# Patient Record
Sex: FEMALE | Race: OTHER | HISPANIC OR LATINO
[De-identification: names, ages, dates, MRNs, and addresses within clinical notes are randomized per-mention and may not be internally consistent; named-entity substitution may affect disease eponyms.]

---

## 2017-02-07 ENCOUNTER — TRANSCRIPTION ENCOUNTER (OUTPATIENT)
Age: 37
End: 2017-02-07

## 2017-06-12 ENCOUNTER — APPOINTMENT (OUTPATIENT)
Dept: VASCULAR SURGERY | Facility: CLINIC | Age: 37
End: 2017-06-12

## 2017-06-16 ENCOUNTER — APPOINTMENT (OUTPATIENT)
Dept: VASCULAR SURGERY | Facility: CLINIC | Age: 37
End: 2017-06-16

## 2017-06-16 VITALS
OXYGEN SATURATION: 98 % | HEART RATE: 80 BPM | DIASTOLIC BLOOD PRESSURE: 91 MMHG | HEIGHT: 64 IN | BODY MASS INDEX: 28.51 KG/M2 | SYSTOLIC BLOOD PRESSURE: 137 MMHG | WEIGHT: 167 LBS | TEMPERATURE: 98 F | RESPIRATION RATE: 16 BRPM

## 2017-06-23 ENCOUNTER — APPOINTMENT (OUTPATIENT)
Dept: VASCULAR SURGERY | Facility: CLINIC | Age: 37
End: 2017-06-23

## 2017-06-30 ENCOUNTER — APPOINTMENT (OUTPATIENT)
Dept: VASCULAR SURGERY | Facility: CLINIC | Age: 37
End: 2017-06-30

## 2017-09-02 ENCOUNTER — TRANSCRIPTION ENCOUNTER (OUTPATIENT)
Age: 37
End: 2017-09-02

## 2017-09-11 ENCOUNTER — OUTPATIENT (OUTPATIENT)
Dept: OUTPATIENT SERVICES | Facility: HOSPITAL | Age: 37
LOS: 1 days | Discharge: HOME | End: 2017-09-11

## 2017-09-11 DIAGNOSIS — I80.9 PHLEBITIS AND THROMBOPHLEBITIS OF UNSPECIFIED SITE: ICD-10-CM

## 2017-09-11 DIAGNOSIS — Z98.89 OTHER SPECIFIED POSTPROCEDURAL STATES: Chronic | ICD-10-CM

## 2017-09-11 DIAGNOSIS — C50.919 MALIGNANT NEOPLASM OF UNSPECIFIED SITE OF UNSPECIFIED FEMALE BREAST: ICD-10-CM

## 2017-09-11 DIAGNOSIS — Z96.22 MYRINGOTOMY TUBE(S) STATUS: Chronic | ICD-10-CM

## 2017-09-11 DIAGNOSIS — R93.3 ABNORMAL FINDINGS ON DIAGNOSTIC IMAGING OF OTHER PARTS OF DIGESTIVE TRACT: Chronic | ICD-10-CM

## 2017-09-11 DIAGNOSIS — Z86.711 PERSONAL HISTORY OF PULMONARY EMBOLISM: Chronic | ICD-10-CM

## 2017-09-11 DIAGNOSIS — C53.9 MALIGNANT NEOPLASM OF CERVIX UTERI, UNSPECIFIED: Chronic | ICD-10-CM

## 2017-09-12 ENCOUNTER — APPOINTMENT (OUTPATIENT)
Dept: HEMATOLOGY ONCOLOGY | Facility: CLINIC | Age: 37
End: 2017-09-12
Payer: COMMERCIAL

## 2017-09-12 ENCOUNTER — OUTPATIENT (OUTPATIENT)
Dept: OUTPATIENT SERVICES | Facility: HOSPITAL | Age: 37
LOS: 1 days | Discharge: HOME | End: 2017-09-12

## 2017-09-12 ENCOUNTER — OUTPATIENT (OUTPATIENT)
Dept: OUTPATIENT SERVICES | Facility: HOSPITAL | Age: 37
LOS: 1 days | Discharge: HOME | End: 2017-09-12
Payer: COMMERCIAL

## 2017-09-12 VITALS
HEART RATE: 75 BPM | TEMPERATURE: 97.8 F | BODY MASS INDEX: 32.27 KG/M2 | DIASTOLIC BLOOD PRESSURE: 90 MMHG | HEIGHT: 64.69 IN | WEIGHT: 191.36 LBS | OXYGEN SATURATION: 98 % | RESPIRATION RATE: 16 BRPM | SYSTOLIC BLOOD PRESSURE: 130 MMHG

## 2017-09-12 DIAGNOSIS — Z96.22 MYRINGOTOMY TUBE(S) STATUS: Chronic | ICD-10-CM

## 2017-09-12 DIAGNOSIS — R93.3 ABNORMAL FINDINGS ON DIAGNOSTIC IMAGING OF OTHER PARTS OF DIGESTIVE TRACT: Chronic | ICD-10-CM

## 2017-09-12 DIAGNOSIS — Z98.89 OTHER SPECIFIED POSTPROCEDURAL STATES: Chronic | ICD-10-CM

## 2017-09-12 DIAGNOSIS — Z86.711 PERSONAL HISTORY OF PULMONARY EMBOLISM: Chronic | ICD-10-CM

## 2017-09-12 DIAGNOSIS — M87.059 IDIOPATHIC ASEPTIC NECROSIS OF UNSPECIFIED FEMUR: ICD-10-CM

## 2017-09-12 DIAGNOSIS — C53.9 MALIGNANT NEOPLASM OF CERVIX UTERI, UNSPECIFIED: Chronic | ICD-10-CM

## 2017-09-12 DIAGNOSIS — C53.9 MALIGNANT NEOPLASM OF CERVIX UTERI, UNSPECIFIED: ICD-10-CM

## 2017-09-12 PROCEDURE — 99205 OFFICE O/P NEW HI 60 MIN: CPT

## 2017-09-12 RX ORDER — ALPRAZOLAM 1 MG/1
1 TABLET ORAL
Qty: 30 | Refills: 0 | Status: DISCONTINUED | COMMUNITY
Start: 2017-03-24 | End: 2017-09-12

## 2017-09-12 RX ORDER — FONDAPARINUX SODIUM 7.5 MG/.6ML
7.5 INJECTION, SOLUTION SUBCUTANEOUS
Refills: 0 | Status: DISCONTINUED | COMMUNITY

## 2017-09-19 ENCOUNTER — RESULT REVIEW (OUTPATIENT)
Age: 37
End: 2017-09-19

## 2017-09-19 PROCEDURE — 88321 CONSLTJ&REPRT SLD PREP ELSWR: CPT

## 2017-09-22 LAB — SURGICAL PATHOLOGY STUDY: SIGNIFICANT CHANGE UP

## 2017-10-22 ENCOUNTER — FORM ENCOUNTER (OUTPATIENT)
Age: 37
End: 2017-10-22

## 2017-10-23 ENCOUNTER — APPOINTMENT (OUTPATIENT)
Dept: NUCLEAR MEDICINE | Facility: CLINIC | Age: 37
End: 2017-10-23
Payer: COMMERCIAL

## 2017-10-23 ENCOUNTER — OUTPATIENT (OUTPATIENT)
Dept: OUTPATIENT SERVICES | Facility: HOSPITAL | Age: 37
LOS: 1 days | End: 2017-10-23

## 2017-10-23 DIAGNOSIS — Z96.22 MYRINGOTOMY TUBE(S) STATUS: Chronic | ICD-10-CM

## 2017-10-23 DIAGNOSIS — Z98.89 OTHER SPECIFIED POSTPROCEDURAL STATES: Chronic | ICD-10-CM

## 2017-10-23 DIAGNOSIS — Z86.711 PERSONAL HISTORY OF PULMONARY EMBOLISM: Chronic | ICD-10-CM

## 2017-10-23 DIAGNOSIS — R93.3 ABNORMAL FINDINGS ON DIAGNOSTIC IMAGING OF OTHER PARTS OF DIGESTIVE TRACT: Chronic | ICD-10-CM

## 2017-10-23 DIAGNOSIS — C53.9 MALIGNANT NEOPLASM OF CERVIX UTERI, UNSPECIFIED: ICD-10-CM

## 2017-10-23 DIAGNOSIS — C53.9 MALIGNANT NEOPLASM OF CERVIX UTERI, UNSPECIFIED: Chronic | ICD-10-CM

## 2017-10-23 PROCEDURE — 78815 PET IMAGE W/CT SKULL-THIGH: CPT | Mod: 26,PS

## 2017-10-30 ENCOUNTER — TRANSCRIPTION ENCOUNTER (OUTPATIENT)
Age: 37
End: 2017-10-30

## 2017-11-10 ENCOUNTER — APPOINTMENT (OUTPATIENT)
Dept: OTOLARYNGOLOGY | Facility: CLINIC | Age: 37
End: 2017-11-10
Payer: COMMERCIAL

## 2017-11-10 ENCOUNTER — OUTPATIENT (OUTPATIENT)
Dept: OUTPATIENT SERVICES | Facility: HOSPITAL | Age: 37
LOS: 1 days | Discharge: HOME | End: 2017-11-10

## 2017-11-10 DIAGNOSIS — Z96.22 MYRINGOTOMY TUBE(S) STATUS: Chronic | ICD-10-CM

## 2017-11-10 DIAGNOSIS — Z86.711 PERSONAL HISTORY OF PULMONARY EMBOLISM: Chronic | ICD-10-CM

## 2017-11-10 DIAGNOSIS — Z98.89 OTHER SPECIFIED POSTPROCEDURAL STATES: Chronic | ICD-10-CM

## 2017-11-10 DIAGNOSIS — C53.9 MALIGNANT NEOPLASM OF CERVIX UTERI, UNSPECIFIED: Chronic | ICD-10-CM

## 2017-11-10 DIAGNOSIS — J35.1 HYPERTROPHY OF TONSILS: ICD-10-CM

## 2017-11-10 DIAGNOSIS — R93.3 ABNORMAL FINDINGS ON DIAGNOSTIC IMAGING OF OTHER PARTS OF DIGESTIVE TRACT: Chronic | ICD-10-CM

## 2017-11-10 DIAGNOSIS — C53.9 MALIGNANT NEOPLASM OF CERVIX UTERI, UNSPECIFIED: ICD-10-CM

## 2017-11-10 PROCEDURE — 99203 OFFICE O/P NEW LOW 30 MIN: CPT

## 2017-11-14 ENCOUNTER — APPOINTMENT (OUTPATIENT)
Dept: HEMATOLOGY ONCOLOGY | Facility: CLINIC | Age: 37
End: 2017-11-14
Payer: COMMERCIAL

## 2017-11-14 VITALS
DIASTOLIC BLOOD PRESSURE: 86 MMHG | RESPIRATION RATE: 16 BRPM | TEMPERATURE: 97.7 F | WEIGHT: 192.57 LBS | SYSTOLIC BLOOD PRESSURE: 130 MMHG | OXYGEN SATURATION: 97 % | BODY MASS INDEX: 32.35 KG/M2 | HEART RATE: 79 BPM

## 2017-11-14 PROCEDURE — 99214 OFFICE O/P EST MOD 30 MIN: CPT

## 2017-11-17 ENCOUNTER — APPOINTMENT (OUTPATIENT)
Dept: RADIOLOGY | Facility: CLINIC | Age: 37
End: 2017-11-17

## 2017-11-21 ENCOUNTER — FORM ENCOUNTER (OUTPATIENT)
Age: 37
End: 2017-11-21

## 2017-11-22 ENCOUNTER — OUTPATIENT (OUTPATIENT)
Dept: OUTPATIENT SERVICES | Facility: HOSPITAL | Age: 37
LOS: 1 days | End: 2017-11-22
Payer: COMMERCIAL

## 2017-11-22 ENCOUNTER — RESULT REVIEW (OUTPATIENT)
Age: 37
End: 2017-11-22

## 2017-11-22 ENCOUNTER — APPOINTMENT (OUTPATIENT)
Dept: ULTRASOUND IMAGING | Facility: IMAGING CENTER | Age: 37
End: 2017-11-22
Payer: COMMERCIAL

## 2017-11-22 DIAGNOSIS — Z86.711 PERSONAL HISTORY OF PULMONARY EMBOLISM: Chronic | ICD-10-CM

## 2017-11-22 DIAGNOSIS — Z98.89 OTHER SPECIFIED POSTPROCEDURAL STATES: Chronic | ICD-10-CM

## 2017-11-22 DIAGNOSIS — C53.9 MALIGNANT NEOPLASM OF CERVIX UTERI, UNSPECIFIED: Chronic | ICD-10-CM

## 2017-11-22 DIAGNOSIS — Z96.22 MYRINGOTOMY TUBE(S) STATUS: Chronic | ICD-10-CM

## 2017-11-22 DIAGNOSIS — J35.1 HYPERTROPHY OF TONSILS: ICD-10-CM

## 2017-11-22 DIAGNOSIS — R93.3 ABNORMAL FINDINGS ON DIAGNOSTIC IMAGING OF OTHER PARTS OF DIGESTIVE TRACT: Chronic | ICD-10-CM

## 2017-11-22 PROCEDURE — 88305 TISSUE EXAM BY PATHOLOGIST: CPT

## 2017-11-22 PROCEDURE — 88305 TISSUE EXAM BY PATHOLOGIST: CPT | Mod: 26

## 2017-11-22 PROCEDURE — 88172 CYTP DX EVAL FNA 1ST EA SITE: CPT

## 2017-11-22 PROCEDURE — 88342 IMHCHEM/IMCYTCHM 1ST ANTB: CPT

## 2017-11-22 PROCEDURE — 88341 IMHCHEM/IMCYTCHM EA ADD ANTB: CPT

## 2017-11-22 PROCEDURE — 88342 IMHCHEM/IMCYTCHM 1ST ANTB: CPT | Mod: 26

## 2017-11-22 PROCEDURE — 10022: CPT

## 2017-11-22 PROCEDURE — 88173 CYTOPATH EVAL FNA REPORT: CPT | Mod: 26

## 2017-11-22 PROCEDURE — 76942 ECHO GUIDE FOR BIOPSY: CPT | Mod: 26

## 2017-11-22 PROCEDURE — 76942 ECHO GUIDE FOR BIOPSY: CPT

## 2017-11-22 PROCEDURE — 88173 CYTOPATH EVAL FNA REPORT: CPT

## 2017-11-22 PROCEDURE — 88341 IMHCHEM/IMCYTCHM EA ADD ANTB: CPT | Mod: 26

## 2017-11-27 LAB — NON-GYNECOLOGICAL CYTOLOGY STUDY: SIGNIFICANT CHANGE UP

## 2017-11-28 ENCOUNTER — RESULT REVIEW (OUTPATIENT)
Age: 37
End: 2017-11-28

## 2017-12-11 ENCOUNTER — APPOINTMENT (OUTPATIENT)
Dept: INTERNAL MEDICINE | Facility: CLINIC | Age: 37
End: 2017-12-11

## 2018-05-04 ENCOUNTER — OUTPATIENT (OUTPATIENT)
Dept: OUTPATIENT SERVICES | Facility: HOSPITAL | Age: 38
LOS: 1 days | End: 2018-05-04

## 2018-05-04 ENCOUNTER — APPOINTMENT (OUTPATIENT)
Dept: ULTRASOUND IMAGING | Facility: CLINIC | Age: 38
End: 2018-05-04
Payer: COMMERCIAL

## 2018-05-04 ENCOUNTER — APPOINTMENT (OUTPATIENT)
Dept: RADIOLOGY | Facility: CLINIC | Age: 38
End: 2018-05-04

## 2018-05-04 DIAGNOSIS — Z98.89 OTHER SPECIFIED POSTPROCEDURAL STATES: Chronic | ICD-10-CM

## 2018-05-04 DIAGNOSIS — Z96.22 MYRINGOTOMY TUBE(S) STATUS: Chronic | ICD-10-CM

## 2018-05-04 DIAGNOSIS — Z00.8 ENCOUNTER FOR OTHER GENERAL EXAMINATION: ICD-10-CM

## 2018-05-04 DIAGNOSIS — R93.3 ABNORMAL FINDINGS ON DIAGNOSTIC IMAGING OF OTHER PARTS OF DIGESTIVE TRACT: Chronic | ICD-10-CM

## 2018-05-04 DIAGNOSIS — C53.9 MALIGNANT NEOPLASM OF CERVIX UTERI, UNSPECIFIED: Chronic | ICD-10-CM

## 2018-05-04 DIAGNOSIS — Z86.711 PERSONAL HISTORY OF PULMONARY EMBOLISM: Chronic | ICD-10-CM

## 2018-05-04 PROCEDURE — 93971 EXTREMITY STUDY: CPT | Mod: 26,RT

## 2018-05-10 ENCOUNTER — OUTPATIENT (OUTPATIENT)
Dept: OUTPATIENT SERVICES | Facility: HOSPITAL | Age: 38
LOS: 1 days | Discharge: HOME | End: 2018-05-10

## 2018-05-10 DIAGNOSIS — Z98.89 OTHER SPECIFIED POSTPROCEDURAL STATES: Chronic | ICD-10-CM

## 2018-05-10 DIAGNOSIS — Z96.22 MYRINGOTOMY TUBE(S) STATUS: Chronic | ICD-10-CM

## 2018-05-10 DIAGNOSIS — R93.3 ABNORMAL FINDINGS ON DIAGNOSTIC IMAGING OF OTHER PARTS OF DIGESTIVE TRACT: Chronic | ICD-10-CM

## 2018-05-10 DIAGNOSIS — C53.9 MALIGNANT NEOPLASM OF CERVIX UTERI, UNSPECIFIED: ICD-10-CM

## 2018-05-10 DIAGNOSIS — C53.9 MALIGNANT NEOPLASM OF CERVIX UTERI, UNSPECIFIED: Chronic | ICD-10-CM

## 2018-05-10 DIAGNOSIS — Z86.711 PERSONAL HISTORY OF PULMONARY EMBOLISM: Chronic | ICD-10-CM

## 2018-05-14 ENCOUNTER — APPOINTMENT (OUTPATIENT)
Dept: HEMATOLOGY ONCOLOGY | Facility: CLINIC | Age: 38
End: 2018-05-14

## 2018-09-05 ENCOUNTER — EMERGENCY (EMERGENCY)
Facility: HOSPITAL | Age: 38
LOS: 1 days | Discharge: HOME | End: 2018-09-05
Attending: EMERGENCY MEDICINE
Payer: COMMERCIAL

## 2018-09-05 ENCOUNTER — APPOINTMENT (OUTPATIENT)
Dept: CT IMAGING | Facility: CLINIC | Age: 38
End: 2018-09-05
Payer: COMMERCIAL

## 2018-09-05 ENCOUNTER — OUTPATIENT (OUTPATIENT)
Dept: OUTPATIENT SERVICES | Facility: HOSPITAL | Age: 38
LOS: 1 days | End: 2018-09-05
Payer: COMMERCIAL

## 2018-09-05 VITALS
RESPIRATION RATE: 18 BRPM | SYSTOLIC BLOOD PRESSURE: 135 MMHG | OXYGEN SATURATION: 100 % | HEART RATE: 87 BPM | WEIGHT: 190.92 LBS | TEMPERATURE: 98 F | DIASTOLIC BLOOD PRESSURE: 82 MMHG | HEIGHT: 64 IN

## 2018-09-05 DIAGNOSIS — Z98.89 OTHER SPECIFIED POSTPROCEDURAL STATES: Chronic | ICD-10-CM

## 2018-09-05 DIAGNOSIS — Z96.22 MYRINGOTOMY TUBE(S) STATUS: Chronic | ICD-10-CM

## 2018-09-05 DIAGNOSIS — Z86.711 PERSONAL HISTORY OF PULMONARY EMBOLISM: Chronic | ICD-10-CM

## 2018-09-05 DIAGNOSIS — R93.3 ABNORMAL FINDINGS ON DIAGNOSTIC IMAGING OF OTHER PARTS OF DIGESTIVE TRACT: Chronic | ICD-10-CM

## 2018-09-05 DIAGNOSIS — Z00.8 ENCOUNTER FOR OTHER GENERAL EXAMINATION: ICD-10-CM

## 2018-09-05 DIAGNOSIS — C53.9 MALIGNANT NEOPLASM OF CERVIX UTERI, UNSPECIFIED: Chronic | ICD-10-CM

## 2018-09-05 LAB
ALBUMIN SERPL ELPH-MCNC: 4.1 G/DL — SIGNIFICANT CHANGE UP (ref 3.3–5.2)
ALP SERPL-CCNC: 83 U/L — SIGNIFICANT CHANGE UP (ref 40–120)
ALT FLD-CCNC: 17 U/L — SIGNIFICANT CHANGE UP
ANION GAP SERPL CALC-SCNC: 13 MMOL/L — SIGNIFICANT CHANGE UP (ref 5–17)
APPEARANCE UR: CLEAR — SIGNIFICANT CHANGE UP
APTT BLD: 52.7 SEC — HIGH (ref 27.5–37.4)
AST SERPL-CCNC: 15 U/L — SIGNIFICANT CHANGE UP
BACTERIA # UR AUTO: ABNORMAL
BILIRUB SERPL-MCNC: 0.3 MG/DL — LOW (ref 0.4–2)
BILIRUB UR-MCNC: NEGATIVE — SIGNIFICANT CHANGE UP
BUN SERPL-MCNC: 12 MG/DL — SIGNIFICANT CHANGE UP (ref 8–20)
CALCIUM SERPL-MCNC: 9.4 MG/DL — SIGNIFICANT CHANGE UP (ref 8.6–10.2)
CHLORIDE SERPL-SCNC: 102 MMOL/L — SIGNIFICANT CHANGE UP (ref 98–107)
CK SERPL-CCNC: 56 U/L — SIGNIFICANT CHANGE UP (ref 25–170)
CO2 SERPL-SCNC: 23 MMOL/L — SIGNIFICANT CHANGE UP (ref 22–29)
COLOR SPEC: YELLOW — SIGNIFICANT CHANGE UP
CREAT SERPL-MCNC: 0.85 MG/DL — SIGNIFICANT CHANGE UP (ref 0.5–1.3)
DIFF PNL FLD: ABNORMAL
EPI CELLS # UR: SIGNIFICANT CHANGE UP
GLUCOSE SERPL-MCNC: 85 MG/DL — SIGNIFICANT CHANGE UP (ref 70–115)
GLUCOSE UR QL: NEGATIVE MG/DL — SIGNIFICANT CHANGE UP
HCT VFR BLD CALC: 41.2 % — SIGNIFICANT CHANGE UP (ref 37–47)
HGB BLD-MCNC: 13 G/DL — SIGNIFICANT CHANGE UP (ref 12–16)
INR BLD: 2.55 RATIO — HIGH (ref 0.88–1.16)
KETONES UR-MCNC: NEGATIVE — SIGNIFICANT CHANGE UP
LACTATE BLDV-MCNC: 0.9 MMOL/L — SIGNIFICANT CHANGE UP (ref 0.5–2)
LEUKOCYTE ESTERASE UR-ACNC: ABNORMAL
LIDOCAIN IGE QN: 37 U/L — SIGNIFICANT CHANGE UP (ref 22–51)
MCHC RBC-ENTMCNC: 26.3 PG — LOW (ref 27–31)
MCHC RBC-ENTMCNC: 31.6 G/DL — LOW (ref 32–36)
MCV RBC AUTO: 83.2 FL — SIGNIFICANT CHANGE UP (ref 81–99)
NITRITE UR-MCNC: NEGATIVE — SIGNIFICANT CHANGE UP
PH UR: 5 — SIGNIFICANT CHANGE UP (ref 5–8)
PLATELET # BLD AUTO: 268 K/UL — SIGNIFICANT CHANGE UP (ref 150–400)
POTASSIUM SERPL-MCNC: 3.8 MMOL/L — SIGNIFICANT CHANGE UP (ref 3.5–5.3)
POTASSIUM SERPL-SCNC: 3.8 MMOL/L — SIGNIFICANT CHANGE UP (ref 3.5–5.3)
PROT SERPL-MCNC: 7.6 G/DL — SIGNIFICANT CHANGE UP (ref 6.6–8.7)
PROT UR-MCNC: 15 MG/DL
PROTHROM AB SERPL-ACNC: 28.6 SEC — HIGH (ref 9.8–12.7)
RBC # BLD: 4.95 M/UL — SIGNIFICANT CHANGE UP (ref 4.4–5.2)
RBC # FLD: 14.4 % — SIGNIFICANT CHANGE UP (ref 11–15.6)
RBC CASTS # UR COMP ASSIST: SIGNIFICANT CHANGE UP /HPF (ref 0–4)
SODIUM SERPL-SCNC: 138 MMOL/L — SIGNIFICANT CHANGE UP (ref 135–145)
SP GR SPEC: 1.01 — SIGNIFICANT CHANGE UP (ref 1.01–1.02)
TROPONIN T SERPL-MCNC: <0.01 NG/ML — SIGNIFICANT CHANGE UP (ref 0–0.06)
UROBILINOGEN FLD QL: NEGATIVE MG/DL — SIGNIFICANT CHANGE UP
WBC # BLD: 6.6 K/UL — SIGNIFICANT CHANGE UP (ref 4.8–10.8)
WBC # FLD AUTO: 6.6 K/UL — SIGNIFICANT CHANGE UP (ref 4.8–10.8)
WBC UR QL: SIGNIFICANT CHANGE UP

## 2018-09-05 PROCEDURE — 93010 ELECTROCARDIOGRAM REPORT: CPT

## 2018-09-05 PROCEDURE — 85610 PROTHROMBIN TIME: CPT

## 2018-09-05 PROCEDURE — 74177 CT ABD & PELVIS W/CONTRAST: CPT

## 2018-09-05 PROCEDURE — 83690 ASSAY OF LIPASE: CPT

## 2018-09-05 PROCEDURE — 81001 URINALYSIS AUTO W/SCOPE: CPT

## 2018-09-05 PROCEDURE — 83605 ASSAY OF LACTIC ACID: CPT

## 2018-09-05 PROCEDURE — 99283 EMERGENCY DEPT VISIT LOW MDM: CPT | Mod: 25

## 2018-09-05 PROCEDURE — 85027 COMPLETE CBC AUTOMATED: CPT

## 2018-09-05 PROCEDURE — 85730 THROMBOPLASTIN TIME PARTIAL: CPT

## 2018-09-05 PROCEDURE — 84484 ASSAY OF TROPONIN QUANT: CPT

## 2018-09-05 PROCEDURE — 80053 COMPREHEN METABOLIC PANEL: CPT

## 2018-09-05 PROCEDURE — 71045 X-RAY EXAM CHEST 1 VIEW: CPT

## 2018-09-05 PROCEDURE — 36415 COLL VENOUS BLD VENIPUNCTURE: CPT

## 2018-09-05 PROCEDURE — 99285 EMERGENCY DEPT VISIT HI MDM: CPT

## 2018-09-05 PROCEDURE — 71045 X-RAY EXAM CHEST 1 VIEW: CPT | Mod: 26

## 2018-09-05 PROCEDURE — 74177 CT ABD & PELVIS W/CONTRAST: CPT | Mod: 26

## 2018-09-05 PROCEDURE — 82550 ASSAY OF CK (CPK): CPT

## 2018-09-05 PROCEDURE — 93005 ELECTROCARDIOGRAM TRACING: CPT

## 2018-09-05 RX ORDER — SODIUM CHLORIDE 9 MG/ML
1000 INJECTION INTRAMUSCULAR; INTRAVENOUS; SUBCUTANEOUS
Qty: 0 | Refills: 0 | Status: DISCONTINUED | OUTPATIENT
Start: 2018-09-05 | End: 2018-09-10

## 2018-09-05 RX ORDER — SODIUM CHLORIDE 9 MG/ML
3 INJECTION INTRAMUSCULAR; INTRAVENOUS; SUBCUTANEOUS ONCE
Qty: 0 | Refills: 0 | Status: COMPLETED | OUTPATIENT
Start: 2018-09-05 | End: 2018-09-05

## 2018-09-05 RX ADMIN — SODIUM CHLORIDE 3 MILLILITER(S): 9 INJECTION INTRAMUSCULAR; INTRAVENOUS; SUBCUTANEOUS at 20:23

## 2018-09-05 NOTE — ED PROVIDER NOTE - MEDICAL DECISION MAKING DETAILS
Plan to obtain f/u CT to eval other sx, surgical consult, labs, blood work and re-eval. Plan to obtain f/u CT to eval other sx, surgical consult, labs, blood work and re-eval. Aware that pt had CT scan with contrast earlier today, will hydrate and still require additional CT scan.

## 2018-09-05 NOTE — ED ADULT TRIAGE NOTE - CHIEF COMPLAINT QUOTE
Patient is awake and oriented times 3, complains "I was told by the [facility] [[email or web address]] after a STAT cat scan that I have a collapsed large intestine", patient denies being on chemo or radiation at this time

## 2018-09-05 NOTE — ED PROVIDER NOTE - OBJECTIVE STATEMENT
38 y/o female with a hx of PE, anxiety, cervical CA, depression, DVT, and HPV presents to the ED c/o abd pain which onset today. Pt has been complaining of left sided pain. She saw her PCP Dr. Gerard Phillips and was sent to an imaging center for a CT scan. She was told that she had Gallstones. She received a call later saying that she had a collapsed large intestine without colitis or obstruction and to come to the ED. Pt states that at this time she feels a HA, CP, and left sided abd pain radiating to the back. Pt is currently on Coumadin. Notes mild SOB which she states is chronic. Denies dysuria. No further complaints at this time.

## 2018-09-05 NOTE — ED ADULT NURSE NOTE - OBJECTIVE STATEMENT
38yo female hx of cervical cancer completed chemo& radiation in 2013 c/o generalized fatigue, flank pain, left arm pain, chest pain, decreased appetite for about a month (wanted to wait for children to go back to school) called pmd, who sent her to [facility]. [facility] sent her for stat ct and called with results telling her to go to ed immediately. pt states she has LBM that are normal for, occasional hematuria from radiation, slight weight loss.

## 2018-09-05 NOTE — ED ADULT NURSE NOTE - CHIEF COMPLAINT QUOTE
Patient is awake and oriented times 3, complains "I was told by the [facility] after a STAT cat scan that I have a collapsed large intestine", patient denies being on chemo or radiation at this time

## 2018-09-05 NOTE — ED STATDOCS - PROGRESS NOTE DETAILS
38 y/o F pt hx of cervical cancer, DVT, saddle PE, LEEP, IVC filter, appendectomy, , and open heart surgery presents to ED c/o recent BABB, chest pressure, occipital HA's, L shoulder pain, and decreased appetite. Pt reports 9 lb. weight loss. She also notes periumbilical and LUQ abdominal pain that wraps around to her back. Pt was seen at her PMD this morning and had a CT scan done. Pt was called by [facility] and was told she had gallstones, a collapsed large intestine. Denies calf pain.   Cardio: Gandotra  GI: Lazar  PE: LUQ and LLQ tenderness to palpation 36 y/o F pt hx of cervical cancer, DVT, saddle PE, LEEP, IVC filter, appendectomy, , and open heart surgery TO REMOVE EMBOLISM, presents to ED c/o recent BABB, chest pressure, occipital HA's, L shoulder pain, and decreased appetite. ALSO LUQ PAIN. Pt reports 9 lb. weight loss. She also notes periumbilical and LUQ abdominal pain that wraps around to her back. Pt was seen at her PMD this morning and had a CT scan done. Pt was called by [facility] and was told she had gallstones, a collapsed large intestine. NO EVIDENCE OF BOWEL OBSTRUCTION. * HAS COLLAPSED LARGE BOWEL. Denies calf pain. ? CAUSE OF COLLAPSED BOWEL. NOT OBSTRUCTED. ? ELECTROLYTE ABNORMALITY ? OTHER ETIOLOGY  NEEDS EXTENSIVE EVALUATION. WILL TRANSFER TO MAIN ROOM FOR EVALUATION BY ANOTHER PROVIDER  BRADEN NOLEN. Cardio: Bridgette  GI: Jaime  PE: LUQ and LLQ tenderness to palpation

## 2018-09-05 NOTE — ED STATDOCS - PMH
Anxiety    Anxiety    Astigmatism of both eyes    Cervical cancer    Cervical cancer    Claustrophobia    Claustrophobia    Depressed    Depressed    DVT (deep venous thrombosis), left    HPV (human papilloma virus) infection    Lung nodules    Migraine    Migraine    Pulmonary emboli    S/P appendectomy    Vertigo    Vertigo

## 2018-09-05 NOTE — ED PROVIDER NOTE - PROGRESS NOTE DETAILS
Spoke to  from [facility], she has no new information to contribute. She is comfortable with f/u in her office. Spoke to surgery team for consult. Spoke to surgery will consult after further imaging Patient requested to talk to me at bedside; she needs to leave, cannot wait further for tests; explained risks and benefits; she appears comfortable, abd has slight left tenderness, but certainly no rebound or guarding; no nausea or vomiting; results of ct reviewed thoroughly, likely incidental note of collapsed colon due to lack of oral contrast, but does not necessarily reflext pathology; patient reassured, would like to leave, has reliable follow up. will d/c

## 2018-09-05 NOTE — ED STATDOCS - PSH
Abnormal colonoscopy  with biop [id number]  Cervical cancer  Lymph node dissection in stomach  H/O lymph node excision    History of embolectomy  pulmonary  History of open heart surgery    History of pulmonary embolus (PE)  Pulmonary Embolectomy and Thrombectomy  Port-a-cath in place  Port removed oct 31 2013  S/P appendectomy    S/P appendectomy    S/P     S/P  section  x2  S/P IVC filter  DVT  S/P IVC filter    S/P LEEP    S/P myringotomy with insertion of tube    S/P myringotomy with insertion of tube  tubes in ears not presently in place

## 2018-09-05 NOTE — ED ADULT NURSE NOTE - NSIMPLEMENTINTERV_GEN_ALL_ED
Implemented All Universal Safety Interventions:  [place] to call system. Call bell, personal items and telephone within reach. Instruct patient to call for assistance. Room bathroom lighting operational. Non-slip footwear when patient is off stretcher. Physically safe environment: no spills, clutter or unnecessary equipment. Stretcher in lowest position, wheels locked, appropriate side rails in place.

## 2018-09-29 ENCOUNTER — APPOINTMENT (OUTPATIENT)
Dept: RADIOLOGY | Facility: CLINIC | Age: 38
End: 2018-09-29

## 2019-01-17 ENCOUNTER — OUTPATIENT (OUTPATIENT)
Dept: OUTPATIENT SERVICES | Facility: HOSPITAL | Age: 39
LOS: 1 days | End: 2019-01-17

## 2019-01-17 DIAGNOSIS — C53.9 MALIGNANT NEOPLASM OF CERVIX UTERI, UNSPECIFIED: Chronic | ICD-10-CM

## 2019-01-17 DIAGNOSIS — Z98.89 OTHER SPECIFIED POSTPROCEDURAL STATES: Chronic | ICD-10-CM

## 2019-01-17 DIAGNOSIS — Z86.711 PERSONAL HISTORY OF PULMONARY EMBOLISM: Chronic | ICD-10-CM

## 2019-01-17 DIAGNOSIS — Z96.22 MYRINGOTOMY TUBE(S) STATUS: Chronic | ICD-10-CM

## 2019-01-17 DIAGNOSIS — R93.3 ABNORMAL FINDINGS ON DIAGNOSTIC IMAGING OF OTHER PARTS OF DIGESTIVE TRACT: Chronic | ICD-10-CM

## 2019-01-22 ENCOUNTER — APPOINTMENT (OUTPATIENT)
Dept: CARDIOLOGY | Facility: CLINIC | Age: 39
End: 2019-01-22
Payer: COMMERCIAL

## 2019-01-22 ENCOUNTER — NON-APPOINTMENT (OUTPATIENT)
Age: 39
End: 2019-01-22

## 2019-01-22 VITALS
SYSTOLIC BLOOD PRESSURE: 125 MMHG | WEIGHT: 189 LBS | HEART RATE: 79 BPM | BODY MASS INDEX: 31.87 KG/M2 | HEIGHT: 64.69 IN | DIASTOLIC BLOOD PRESSURE: 84 MMHG | OXYGEN SATURATION: 100 %

## 2019-01-22 VITALS — SYSTOLIC BLOOD PRESSURE: 125 MMHG | DIASTOLIC BLOOD PRESSURE: 86 MMHG

## 2019-01-22 DIAGNOSIS — R59.0 LOCALIZED ENLARGED LYMPH NODES: ICD-10-CM

## 2019-01-22 PROCEDURE — 93306 TTE W/DOPPLER COMPLETE: CPT

## 2019-01-22 PROCEDURE — 99245 OFF/OP CONSLTJ NEW/EST HI 55: CPT

## 2019-01-22 PROCEDURE — 0399T: CPT

## 2019-01-22 PROCEDURE — 93000 ELECTROCARDIOGRAM COMPLETE: CPT

## 2019-01-22 NOTE — HISTORY OF PRESENT ILLNESS
[Preoperative Visit] : for a medical evaluation prior to surgery [Scheduled Procedure ___] : a [unfilled] [Date of Surgery ___] : on [unfilled] [Surgeon Name ___] : surgeon: [unfilled] [Good] : Good [Fever] : fever [Chills] : chills [Fatigue] : fatigue [Diarrhea] : diarrhea [Abdominal Pain] : abdominal pain [Easy Bruising] : easy bruising [Lower Extremity Swelling] : lower extremity swelling [Poor Exercise Tolerance] : poor exercise tolerance [Prior Anesthesia] : Prior anesthesia [Electrocardiogram] : ~T an ECG ~C was performed [Echocardiogram] : ~T an echocardiogram ~C was performed [Metabolic Capacity ___Mets%] : The patient has a metabolic capacity of [unfilled] Mets%  [Fair] : Fair [Chest Pain] : no chest pain [Cough] : no cough [Dyspnea] : no dyspnea [Dysuria] : no dysuria [Urinary Frequency] : no urinary frequency [Nausea] : no nausea [Diabetes] : no diabetes [Cardiovascular Disease] : no cardiovascular disease [Prev Anesthesia Reaction] : no previous anesthesia reaction [de-identified] : TONY- avril [de-identified] : patient canwalk:  bilateral avascular necrosis of both hips. can walk into the food store.  [FreeTextEntry1] : reason for appt: Pre-operative cardiovascular risk evaluation and management and pulmonary embolism and left LEg chronnic DVT . iVC filter. \par \par This is a 38 year old woman with istory of cervical cancer, 2/2013, s?p chemoradiation, with left leg DVT/ PE with pulmonary embolectomy  and chornic left leg DVT s/p IVC filter, non retrivable.\par Patient is here for Pre-operative cardiovascular risk evaluation and management \par no chest pain. no dyspnea. no dizziness. no palpitaitons./ compliant with meds. INR therapeutics.  cannot eat due to gall bladder symptoms,.\par patient was fololwiong uip with dr aguirre, but did not see him for few years.  she could not get an appt with dr cody as new patient\par

## 2019-01-22 NOTE — ADDENDUM
[FreeTextEntry1] : 1/22/2019: 12: 00 pm: Echo reviewed. Normal LVEf . normal RV function.  pulmonary pressures not obtained.\par Proceed for surgery as indicated. no furhter cardiac work up is needed. \par

## 2019-01-22 NOTE — DISCUSSION/SUMMARY
[Procedure Low Risk] : the procedure risk is low [Additional Diagnostics Recommended] : additional diagnostics recommended [As per surgery] : as per surgery [Continue] : Continue medications as currently directed [FreeTextEntry1] : This is a 38 year old woman with history cervical cancer, 2013 chemo radiation, treated, with negative PET scan, chronict left leg DVT s/p IVC filter, PE with embolectomy here for Pre-operative cardiovascular risk evaluation and management\par 1) Pre-operative cardiovascular risk evaluation and management: 2D echo Pulmonary pressurs and RV strain \par  METS ~ 4.  ct anticoagulation iwht heparin. may be interrupted during surgery. resume as soon as possibole after surgery.\par  if no significant pulmonary hypertension , may proceed for surgery without any further work up.\par 2) PE s/p embolectmy: echo to evaluate for RV function\par 3) Left leg chronic DVT : s/p non tretriavable IVC filter. f/u with Dr. veras.  on anticoagulation with coumadin. life liong,. unprovoked DVT/PE. \par 4) Chronic LE edema: compressions stockins. as per vascualr no further  venous intervention is needed.

## 2019-01-22 NOTE — PHYSICAL EXAM
[General Appearance - Well Developed] : well developed [Normal Appearance] : normal appearance [Well Groomed] : well groomed [General Appearance - Well Nourished] : well nourished [No Deformities] : no deformities [General Appearance - In No Acute Distress] : no acute distress [Normal Conjunctiva] : the conjunctiva exhibited no abnormalities [Eyelids - No Xanthelasma] : the eyelids demonstrated no xanthelasmas [Normal Oral Mucosa] : normal oral mucosa [No Oral Pallor] : no oral pallor [No Oral Cyanosis] : no oral cyanosis [Normal Jugular Venous A Waves Present] : normal jugular venous A waves present [Normal Jugular Venous V Waves Present] : normal jugular venous V waves present [No Jugular Venous Posadas A Waves] : no jugular venous posadas A waves [Respiration, Rhythm And Depth] : normal respiratory rhythm and effort [Exaggerated Use Of Accessory Muscles For Inspiration] : no accessory muscle use [Auscultation Breath Sounds / Voice Sounds] : lungs were clear to auscultation bilaterally [Heart Rate And Rhythm] : heart rate and rhythm were normal [Heart Sounds] : normal S1 and S2 [Murmurs] : no murmurs present [Abdomen Soft] : soft [Abdomen Tenderness] : non-tender [Abdomen Mass (___ Cm)] : no abdominal mass palpated [Abnormal Walk] : normal gait [Gait - Sufficient For Exercise Testing] : the gait was sufficient for exercise testing [Nail Clubbing] : no clubbing of the fingernails [Cyanosis, Localized] : no localized cyanosis [Petechial Hemorrhages (___cm)] : no petechial hemorrhages [Skin Color & Pigmentation] : normal skin color and pigmentation [] : no rash [No Venous Stasis] : no venous stasis [Skin Lesions] : no skin lesions [No Skin Ulcers] : no skin ulcer [No Xanthoma] : no  xanthoma was observed [Oriented To Time, Place, And Person] : oriented to person, place, and time [Affect] : the affect was normal [Mood] : the mood was normal [No Anxiety] : not feeling anxious

## 2019-01-24 ENCOUNTER — APPOINTMENT (OUTPATIENT)
Dept: MRI IMAGING | Facility: CLINIC | Age: 39
End: 2019-01-24
Payer: COMMERCIAL

## 2019-01-24 ENCOUNTER — OUTPATIENT (OUTPATIENT)
Dept: OUTPATIENT SERVICES | Facility: HOSPITAL | Age: 39
LOS: 1 days | End: 2019-01-24
Payer: COMMERCIAL

## 2019-01-24 DIAGNOSIS — Z98.89 OTHER SPECIFIED POSTPROCEDURAL STATES: Chronic | ICD-10-CM

## 2019-01-24 DIAGNOSIS — C53.9 MALIGNANT NEOPLASM OF CERVIX UTERI, UNSPECIFIED: Chronic | ICD-10-CM

## 2019-01-24 DIAGNOSIS — Z86.711 PERSONAL HISTORY OF PULMONARY EMBOLISM: Chronic | ICD-10-CM

## 2019-01-24 DIAGNOSIS — Z96.22 MYRINGOTOMY TUBE(S) STATUS: Chronic | ICD-10-CM

## 2019-01-24 DIAGNOSIS — Z00.8 ENCOUNTER FOR OTHER GENERAL EXAMINATION: ICD-10-CM

## 2019-01-24 DIAGNOSIS — R93.3 ABNORMAL FINDINGS ON DIAGNOSTIC IMAGING OF OTHER PARTS OF DIGESTIVE TRACT: Chronic | ICD-10-CM

## 2019-01-24 PROCEDURE — 72146 MRI CHEST SPINE W/O DYE: CPT | Mod: 26

## 2019-01-24 PROCEDURE — 72141 MRI NECK SPINE W/O DYE: CPT | Mod: 26

## 2019-01-24 PROCEDURE — 72148 MRI LUMBAR SPINE W/O DYE: CPT | Mod: 26

## 2019-01-24 PROCEDURE — 72141 MRI NECK SPINE W/O DYE: CPT

## 2019-01-24 PROCEDURE — 72148 MRI LUMBAR SPINE W/O DYE: CPT

## 2019-01-24 PROCEDURE — 72146 MRI CHEST SPINE W/O DYE: CPT

## 2019-01-25 ENCOUNTER — APPOINTMENT (OUTPATIENT)
Dept: MRI IMAGING | Facility: CLINIC | Age: 39
End: 2019-01-25
Payer: COMMERCIAL

## 2019-01-25 ENCOUNTER — OUTPATIENT (OUTPATIENT)
Dept: OUTPATIENT SERVICES | Facility: HOSPITAL | Age: 39
LOS: 1 days | End: 2019-01-25
Payer: COMMERCIAL

## 2019-01-25 DIAGNOSIS — Z98.89 OTHER SPECIFIED POSTPROCEDURAL STATES: Chronic | ICD-10-CM

## 2019-01-25 DIAGNOSIS — Z00.8 ENCOUNTER FOR OTHER GENERAL EXAMINATION: ICD-10-CM

## 2019-01-25 DIAGNOSIS — Z96.22 MYRINGOTOMY TUBE(S) STATUS: Chronic | ICD-10-CM

## 2019-01-25 DIAGNOSIS — C53.9 MALIGNANT NEOPLASM OF CERVIX UTERI, UNSPECIFIED: Chronic | ICD-10-CM

## 2019-01-25 DIAGNOSIS — R93.3 ABNORMAL FINDINGS ON DIAGNOSTIC IMAGING OF OTHER PARTS OF DIGESTIVE TRACT: Chronic | ICD-10-CM

## 2019-01-25 DIAGNOSIS — Z86.711 PERSONAL HISTORY OF PULMONARY EMBOLISM: Chronic | ICD-10-CM

## 2019-01-25 PROCEDURE — 73721 MRI JNT OF LWR EXTRE W/O DYE: CPT | Mod: 26,LT

## 2019-01-25 PROCEDURE — 73721 MRI JNT OF LWR EXTRE W/O DYE: CPT

## 2019-01-25 PROCEDURE — 73721 MRI JNT OF LWR EXTRE W/O DYE: CPT | Mod: 26,RT,76

## 2019-01-31 ENCOUNTER — OUTPATIENT (OUTPATIENT)
Dept: OUTPATIENT SERVICES | Facility: HOSPITAL | Age: 39
LOS: 1 days | End: 2019-01-31

## 2019-01-31 DIAGNOSIS — Z98.89 OTHER SPECIFIED POSTPROCEDURAL STATES: Chronic | ICD-10-CM

## 2019-01-31 DIAGNOSIS — C53.9 MALIGNANT NEOPLASM OF CERVIX UTERI, UNSPECIFIED: Chronic | ICD-10-CM

## 2019-01-31 DIAGNOSIS — Z96.22 MYRINGOTOMY TUBE(S) STATUS: Chronic | ICD-10-CM

## 2019-01-31 DIAGNOSIS — Z86.711 PERSONAL HISTORY OF PULMONARY EMBOLISM: Chronic | ICD-10-CM

## 2019-01-31 DIAGNOSIS — R93.3 ABNORMAL FINDINGS ON DIAGNOSTIC IMAGING OF OTHER PARTS OF DIGESTIVE TRACT: Chronic | ICD-10-CM

## 2019-03-12 ENCOUNTER — TRANSCRIPTION ENCOUNTER (OUTPATIENT)
Age: 39
End: 2019-03-12

## 2019-05-16 ENCOUNTER — OUTPATIENT (OUTPATIENT)
Dept: OUTPATIENT SERVICES | Facility: HOSPITAL | Age: 39
LOS: 1 days | End: 2019-05-16
Payer: COMMERCIAL

## 2019-05-16 ENCOUNTER — APPOINTMENT (OUTPATIENT)
Dept: CT IMAGING | Facility: CLINIC | Age: 39
End: 2019-05-16
Payer: COMMERCIAL

## 2019-05-16 ENCOUNTER — APPOINTMENT (OUTPATIENT)
Dept: MAMMOGRAPHY | Facility: CLINIC | Age: 39
End: 2019-05-16
Payer: COMMERCIAL

## 2019-05-16 DIAGNOSIS — Z96.22 MYRINGOTOMY TUBE(S) STATUS: Chronic | ICD-10-CM

## 2019-05-16 DIAGNOSIS — Z98.89 OTHER SPECIFIED POSTPROCEDURAL STATES: Chronic | ICD-10-CM

## 2019-05-16 DIAGNOSIS — R93.3 ABNORMAL FINDINGS ON DIAGNOSTIC IMAGING OF OTHER PARTS OF DIGESTIVE TRACT: Chronic | ICD-10-CM

## 2019-05-16 DIAGNOSIS — C53.9 MALIGNANT NEOPLASM OF CERVIX UTERI, UNSPECIFIED: Chronic | ICD-10-CM

## 2019-05-16 DIAGNOSIS — Z00.8 ENCOUNTER FOR OTHER GENERAL EXAMINATION: ICD-10-CM

## 2019-05-16 DIAGNOSIS — Z86.711 PERSONAL HISTORY OF PULMONARY EMBOLISM: Chronic | ICD-10-CM

## 2019-05-16 PROCEDURE — 70470 CT HEAD/BRAIN W/O & W/DYE: CPT | Mod: 26

## 2019-05-16 PROCEDURE — 77063 BREAST TOMOSYNTHESIS BI: CPT | Mod: 26

## 2019-05-16 PROCEDURE — 77067 SCR MAMMO BI INCL CAD: CPT | Mod: 26

## 2019-05-16 PROCEDURE — 70470 CT HEAD/BRAIN W/O & W/DYE: CPT

## 2019-05-16 PROCEDURE — 77063 BREAST TOMOSYNTHESIS BI: CPT

## 2019-05-16 PROCEDURE — 77067 SCR MAMMO BI INCL CAD: CPT

## 2019-05-21 ENCOUNTER — OUTPATIENT (OUTPATIENT)
Dept: OUTPATIENT SERVICES | Facility: HOSPITAL | Age: 39
LOS: 1 days | End: 2019-05-21
Payer: COMMERCIAL

## 2019-05-21 ENCOUNTER — APPOINTMENT (OUTPATIENT)
Dept: ULTRASOUND IMAGING | Facility: CLINIC | Age: 39
End: 2019-05-21
Payer: COMMERCIAL

## 2019-05-21 ENCOUNTER — APPOINTMENT (OUTPATIENT)
Dept: MAMMOGRAPHY | Facility: CLINIC | Age: 39
End: 2019-05-21
Payer: COMMERCIAL

## 2019-05-21 DIAGNOSIS — Z86.711 PERSONAL HISTORY OF PULMONARY EMBOLISM: Chronic | ICD-10-CM

## 2019-05-21 DIAGNOSIS — Z98.89 OTHER SPECIFIED POSTPROCEDURAL STATES: Chronic | ICD-10-CM

## 2019-05-21 DIAGNOSIS — Z00.00 ENCOUNTER FOR GENERAL ADULT MEDICAL EXAMINATION WITHOUT ABNORMAL FINDINGS: ICD-10-CM

## 2019-05-21 DIAGNOSIS — Z96.22 MYRINGOTOMY TUBE(S) STATUS: Chronic | ICD-10-CM

## 2019-05-21 DIAGNOSIS — R93.3 ABNORMAL FINDINGS ON DIAGNOSTIC IMAGING OF OTHER PARTS OF DIGESTIVE TRACT: Chronic | ICD-10-CM

## 2019-05-21 DIAGNOSIS — C53.9 MALIGNANT NEOPLASM OF CERVIX UTERI, UNSPECIFIED: Chronic | ICD-10-CM

## 2019-05-21 PROCEDURE — G0279: CPT

## 2019-05-21 PROCEDURE — 77065 DX MAMMO INCL CAD UNI: CPT | Mod: 26,LT

## 2019-05-21 PROCEDURE — 76642 ULTRASOUND BREAST LIMITED: CPT | Mod: 26,LT

## 2019-05-21 PROCEDURE — 76642 ULTRASOUND BREAST LIMITED: CPT

## 2019-05-21 PROCEDURE — 77065 DX MAMMO INCL CAD UNI: CPT

## 2019-05-21 PROCEDURE — G0279: CPT | Mod: 26

## 2019-06-05 ENCOUNTER — APPOINTMENT (OUTPATIENT)
Dept: BREAST CENTER | Facility: CLINIC | Age: 39
End: 2019-06-05
Payer: COMMERCIAL

## 2019-06-05 DIAGNOSIS — Z12.31 ENCOUNTER FOR SCREENING MAMMOGRAM FOR MALIGNANT NEOPLASM OF BREAST: ICD-10-CM

## 2019-06-05 DIAGNOSIS — N64.4 MASTODYNIA: ICD-10-CM

## 2019-06-05 PROCEDURE — 99204 OFFICE O/P NEW MOD 45 MIN: CPT

## 2019-06-05 RX ORDER — IBUPROFEN 600 MG/1
600 TABLET, FILM COATED ORAL
Qty: 14 | Refills: 0 | Status: DISCONTINUED | COMMUNITY
Start: 2019-01-31

## 2019-06-05 RX ORDER — CHOLESTYRAMINE 4 G/5.5G
4 POWDER, FOR SUSPENSION ORAL
Qty: 30 | Refills: 0 | Status: DISCONTINUED | COMMUNITY
Start: 2018-12-19

## 2019-06-07 ENCOUNTER — OUTPATIENT (OUTPATIENT)
Dept: OUTPATIENT SERVICES | Facility: HOSPITAL | Age: 39
LOS: 1 days | Discharge: HOME | End: 2019-06-07

## 2019-06-07 DIAGNOSIS — C53.9 MALIGNANT NEOPLASM OF CERVIX UTERI, UNSPECIFIED: ICD-10-CM

## 2019-06-07 DIAGNOSIS — Z96.22 MYRINGOTOMY TUBE(S) STATUS: Chronic | ICD-10-CM

## 2019-06-07 DIAGNOSIS — Z98.89 OTHER SPECIFIED POSTPROCEDURAL STATES: Chronic | ICD-10-CM

## 2019-06-07 DIAGNOSIS — R93.3 ABNORMAL FINDINGS ON DIAGNOSTIC IMAGING OF OTHER PARTS OF DIGESTIVE TRACT: Chronic | ICD-10-CM

## 2019-06-07 DIAGNOSIS — C53.9 MALIGNANT NEOPLASM OF CERVIX UTERI, UNSPECIFIED: Chronic | ICD-10-CM

## 2019-06-07 DIAGNOSIS — Z86.711 PERSONAL HISTORY OF PULMONARY EMBOLISM: Chronic | ICD-10-CM

## 2019-06-11 ENCOUNTER — APPOINTMENT (OUTPATIENT)
Dept: HEMATOLOGY ONCOLOGY | Facility: CLINIC | Age: 39
End: 2019-06-11

## 2019-06-12 ENCOUNTER — APPOINTMENT (OUTPATIENT)
Dept: RADIOLOGY | Facility: CLINIC | Age: 39
End: 2019-06-12
Payer: COMMERCIAL

## 2019-06-12 ENCOUNTER — APPOINTMENT (OUTPATIENT)
Dept: MRI IMAGING | Facility: CLINIC | Age: 39
End: 2019-06-12
Payer: COMMERCIAL

## 2019-06-12 ENCOUNTER — OUTPATIENT (OUTPATIENT)
Dept: OUTPATIENT SERVICES | Facility: HOSPITAL | Age: 39
LOS: 1 days | End: 2019-06-12
Payer: COMMERCIAL

## 2019-06-12 DIAGNOSIS — Z98.89 OTHER SPECIFIED POSTPROCEDURAL STATES: Chronic | ICD-10-CM

## 2019-06-12 DIAGNOSIS — Z96.22 MYRINGOTOMY TUBE(S) STATUS: Chronic | ICD-10-CM

## 2019-06-12 DIAGNOSIS — N64.4 MASTODYNIA: ICD-10-CM

## 2019-06-12 DIAGNOSIS — Z86.711 PERSONAL HISTORY OF PULMONARY EMBOLISM: Chronic | ICD-10-CM

## 2019-06-12 DIAGNOSIS — R93.3 ABNORMAL FINDINGS ON DIAGNOSTIC IMAGING OF OTHER PARTS OF DIGESTIVE TRACT: Chronic | ICD-10-CM

## 2019-06-12 DIAGNOSIS — C53.9 MALIGNANT NEOPLASM OF CERVIX UTERI, UNSPECIFIED: Chronic | ICD-10-CM

## 2019-06-12 PROCEDURE — 71110 X-RAY EXAM RIBS BIL 3 VIEWS: CPT | Mod: 26

## 2019-06-12 PROCEDURE — A9585: CPT

## 2019-06-12 PROCEDURE — 77080 DXA BONE DENSITY AXIAL: CPT | Mod: 26

## 2019-06-12 PROCEDURE — C8908: CPT

## 2019-06-12 PROCEDURE — 77049 MRI BREAST C-+ W/CAD BI: CPT | Mod: 26

## 2019-06-12 PROCEDURE — 77080 DXA BONE DENSITY AXIAL: CPT

## 2019-06-12 PROCEDURE — 71110 X-RAY EXAM RIBS BIL 3 VIEWS: CPT

## 2019-06-12 PROCEDURE — C8937: CPT

## 2019-06-27 ENCOUNTER — APPOINTMENT (OUTPATIENT)
Dept: MRI IMAGING | Facility: CLINIC | Age: 39
End: 2019-06-27

## 2019-07-02 ENCOUNTER — APPOINTMENT (OUTPATIENT)
Dept: MRI IMAGING | Facility: CLINIC | Age: 39
End: 2019-07-02

## 2019-07-11 NOTE — PAST MEDICAL HISTORY
[Menarche Age ____] : age at menarche was [unfilled] [Live Births ___] : P[unfilled]  [Total Preg ___] : G[unfilled] [Age At Live Birth ___] : Age at live birth: [unfilled]

## 2019-07-11 NOTE — HISTORY OF PRESENT ILLNESS
[FreeTextEntry1] : I had the pleasure of seeing TRICIA LEWIS  in the office today for a new breast evaluation secondary to BIRADS 0 imaging.\par \par Ms. 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  She has a history of abnormal pap smear in 2008, positive HPV. In February 2013, she had a LEEP procedure which showed invasive squamous cell carcinoma of the cervix in all 4 quadrants of endocervix.  She subsequently had a PET CT which showed positive bilateral common iliac nodes and a node at aortic bifurcation and external iliac nodes. She subsequently had exploratory lap on 3/25/13, and was ultimately found to have metastatic disease to the left internal iliac and right common iliac node. She subsequently received definitive chemoradiation with weekly cisplatin completed in June 2013.  She then received 2800 cGY HDR intracavitary brachytherapy completed in July 2013.  Her course was complicated by DVT and large central pulmonary embolism, cardiogenic shock, and subsequently had embolectomy on 6/18/13.  She remains on warfarin.  \par \par Last surveillance PET CT on 2/19/2016 showed BRANDI.  She last had a colposcopy on 7/18/16 by Dr. Vale Gavin which showed HGSIL.  She was told to use Aldara which she last used in Summer 2016. \par \par She has not seen a physician at [facility] for 1 year. She reports her weight fluctuates. She reports a poor appetite. She has chronic pelvic pain at rest. Denies any vaginal discharge. No vaginal bleeding.  She occasionally has hematuria.  \par \par Imaging [facility] at [facility] 5/16/2019\par 5/16/2019 MG MAMMO SCREEN W RAKAN BI\par Impression:  Asymmetry in the outer posterior left breast.  Recommend additional left diagnostic mammographic views and possible left breast ultrasound for further evaluation.  BIRADS 0 incomplete\par \par We reviewed and discussed clinical exam and recent imaging.  Due to history and new findings recommendation is for MRI breast now and follow up after MRI to discuss results.  If MRI recommends for a  biopsy then she will follow up 1 week after biopsy.  She understands and agrees to plan.  All questions answered.\par \par  [de-identified] : Ms. 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  She has a history of abnormal pap smear in 2008, positive HPV. \par In February 2013, she had a LEEP procedure which showed invasive squamous cell carcinoma of the cervix in all 4 quadrants of endocervix.  She subsequently had a PET CT which showed positive bilateral common iliac nodes and a node at aortic bifurcation and external iliac nodes. She subsequently had exploratory lap on 3/25/13, and was ultimately found to have metastatic disease to the left internal iliac and right common iliac node. She subsequently received definitive chemoradiation with weekly cisplatin completed in June 2013.  She then received 2800 cGY HDR intracavitary brachytherapy completed in July 2013.  Her course was complicated by DVT and large central pulmonary embolism, cardiogenic shock, and subsequently had embolectomy on 6/18/13.  She remains on warfarin.  \par \par Last surveillance PET CT on 2/19/2016 showed BRANDI.  She last had a colposcopy on 7/18/16 by Dr. Vale Gavin which showed HGSIL.  She was told to use Aldara which she last used in Summer 2016. \par \par She's currently recovering from a bilateral ear infection which she has had for 3 weeks. She was seen at urgent care gave her Z-hans.   She is not feeling better after antibiotics. \par She is seeing PCP tomorrow.\par \par She has not seen a physician at [facility] for 1 year. She reports her weight fluctuates. She reports a poor appetite. She has chronic pelvic pain at rest. Denies any vaginal discharge. No vaginal bleeding.  She occasionally has hematuria.   [de-identified] : She's returns for follow up.\par She was seen by Dr. Luis Fernando Freeman for cervical lymphadenopathy / tonsillar hypertrophy - plan is for US neck +/- FNA.\par She will also see Dr. De La Rosa soon for removal of adenoids + placement of eustachian tubes.\par She has not yet had a cervical / gyn exam, reports that previously she has needed exams under anesthesia due to pain / fusion.\par

## 2019-07-11 NOTE — ASSESSMENT
[FreeTextEntry1] : 37 year old female with history of cervical cancer s/p definitive chemoradiation in June 2013.  She is 4 years out from treatment.  She was previously at [facility].  Her last GYN exam was in 7/2016.  \par \par  PET CT on 10/23/17 - BRANDI, and mild uptake in tonsills and right cervical lymph node - probably reactive.  She is getting work up for this with Dr. Freeman and Dr. De La Rosa.\par \par We discussed survivorship and surveillance strategy per NCCN guidelines to include H&P every 6-12 months for years 4-5 and then annually.  She needs a yearly cervical/vaginal cytology - exam needed under anesthesia per patient due to significant pain with exams.  Given menopause - will get dexa scan.\par \par Also discussed follow up with PCP for routine labs, yearly physical, referral to pain management for pain due to AVN of hip.\par \par Follow up in 6 months.

## 2019-07-11 NOTE — PHYSICAL EXAM
[Atraumatic] : atraumatic [Normocephalic] : normocephalic [EOMI] : extra ocular movement intact [PERRL] : pupils equal, round and reactive to light [Sclera nonicteric] : sclera nonicteric [Supple] : supple [No Supraclavicular Adenopathy] : no supraclavicular adenopathy [Examined in the supine and seated position] : examined in the supine and seated position [No dominant masses] : no dominant masses in right breast  [No dominant masses] : no dominant masses left breast [No Nipple Retraction] : no left nipple retraction [No Nipple Discharge] : no left nipple discharge [No Axillary Lymphadenopathy] : no left axillary lymphadenopathy [No Edema] : no edema [No Rashes] : no rashes [No Ulceration] : no ulceration [Breast Nipple Inversion] : nipples not inverted [Breast Nipple Retraction] : nipples not retracted [Breast Nipple Fissures] : nipples not fissured [Breast Abnormal Lactation (Galactorrhea)] : no galactorrhea [Breast Nipple Flattening] : nipples not flattened [Breast Abnormal Secretion Bloody Fluid] : no bloody discharge [Breast Abnormal Secretion Serous Fluid] : no serous discharge [Breast Abnormal Secretion Opalescent Fluid] : no milky discharge [de-identified] : No supraclavicular or axillary adenopathy. No dominant masses, normal to palpation. Everted nipple without discharge. No skin changes.\par \par  [de-identified] : No supraclavicular or axillary adenopathy. No dominant masses, normal to palpation. Everted nipple without discharge. No skin changes.\par \par

## 2019-07-11 NOTE — ASSESSMENT
[FreeTextEntry1] : 37 year old female with history of cervical cancer s/p definitive chemoradiation in June 2013.  She is 4 years out from treatment.  [place] [facility]  Her last GYN exam was in 7/2016.  \par \par  PET CT on 10/23/17 - BRANID, and mild uptake in tonsills and right cervical lymph node - probably reactive.  She is getting work up for this with Dr. Freeman and Dr. De La Rosa.\par \par We discussed survivorship and surveillance strategy per NCCN guidelines to include H&P every 6-12 months for years 4-5 and then annually.  She needs a yearly cervical/vaginal cytology - exam needed under anesthesia per patient due to significant pain with exams.  Given menopause - will get dexa scan.\par \par Also discussed follow up with PCP for routine labs, yearly physical, referral to pain management for pain due to AVN of hip.\par \par Follow up in 6 months.

## 2019-07-11 NOTE — HISTORY OF PRESENT ILLNESS
[FreeTextEntry1] : I had the pleasure of seeing TRICIA LEWIS  in the office today for a new breast evaluation secondary to BIRADS 0 imaging.\par \par Ms. 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  She has a history of abnormal pap smear in 2008, positive HPV. In February 2013, she had a LEEP procedure which showed invasive squamous cell carcinoma of the cervix in all 4 quadrants of endocervix.  She subsequently had a PET CT which showed positive bilateral common iliac nodes and a node at aortic bifurcation and external iliac nodes. She subsequently had exploratory lap on 3/25/13, and was ultimately found to have metastatic disease to the left internal iliac and right common iliac node. She subsequently received definitive chemoradiation with weekly cisplatin completed in June 2013.  She then received 2800 cGY HDR intracavitary brachytherapy completed in July 2013.  Her course was complicated by DVT and large central pulmonary embolism, cardiogenic shock, and subsequently had embolectomy on 6/18/13.  She remains on warfarin.  \par \par Last surveillance PET CT on 2/19/2016 showed BRANDI.  She last had a colposcopy on 7/18/16 by Dr. Vale Gavin which showed HGSIL.  She was told to use Aldara which she last used in Summer 2016. \par \par She has not seen a physician at [facility] for 1 year. She reports her weight fluctuates. She reports a poor appetite. She has chronic pelvic pain at rest. Denies any vaginal discharge. No vaginal bleeding.  She occasionally has hematuria.  \par \par Imaging [facility] at [facility] 5/16/2019\par 5/16/2019 MG MAMMO SCREEN W RAKAN BI\par Impression:  Asymmetry in the outer posterior left breast.  Recommend additional left diagnostic mammographic views and possible left breast ultrasound for further evaluation.  BIRADS 0 incomplete\par \par We reviewed and discussed clinical exam and recent imaging.  Due to history and new findings recommendation is for MRI breast now and follow up after MRI to discuss results.  If MRI recommends for a  biopsy then she will follow up 1 week after biopsy.  She understands and agrees to plan.  All questions answered.\par \par  [de-identified] : Ms. 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  She has a history of abnormal pap smear in 2008, positive HPV. \par In February 2013, she had a LEEP procedure which showed invasive squamous cell carcinoma of the cervix in all 4 quadrants of endocervix.  She subsequently had a PET CT which showed positive bilateral common iliac nodes and a node at aortic bifurcation and external iliac nodes. She subsequently had exploratory lap on 3/25/13, and was ultimately found to have metastatic disease to the left internal iliac and right common iliac node. She subsequently received definitive chemoradiation with weekly cisplatin completed in June 2013.  She then received 2800 cGY HDR intracavitary brachytherapy completed in July 2013.  Her course was complicated by DVT and large central pulmonary embolism, cardiogenic shock, and subsequently had embolectomy on 6/18/13.  She remains on warfarin.  \par \par Last surveillance PET CT on 2/19/2016 showed BRANDI.  She last had a colposcopy on 7/18/16 by Dr. Vale Gavin which showed HGSIL.  She was told to use Aldara which she last used in Summer 2016. \par \par She's currently recovering from a bilateral ear infection which she has had for 3 weeks. She was seen at urgent care gave her Z-hans.   She is not feeling better after antibiotics. \par She is seeing PCP tomorrow.\par \par She has not seen a physician at [facility] for 1 year. She reports her weight fluctuates. She reports a poor appetite. She has chronic pelvic pain at rest. Denies any vaginal discharge. No vaginal bleeding.  She occasionally has hematuria.   [de-identified] : She's returns for follow up.\par She was seen by Dr. Luis Fernando Freeman for cervical lymphadenopathy / tonsillar hypertrophy - plan is for US neck +/- FNA.\par She will also see Dr. De La Rosa soon for removal of adenoids + placement of eustachian tubes.\par She has not yet had a cervical / gyn exam, reports that previously she has needed exams under anesthesia due to pain / fusion.\par

## 2019-07-11 NOTE — PHYSICAL EXAM
[Normocephalic] : normocephalic [Atraumatic] : atraumatic [EOMI] : extra ocular movement intact [PERRL] : pupils equal, round and reactive to light [Sclera nonicteric] : sclera nonicteric [Supple] : supple [No Supraclavicular Adenopathy] : no supraclavicular adenopathy [Examined in the supine and seated position] : examined in the supine and seated position [No dominant masses] : no dominant masses in right breast  [No dominant masses] : no dominant masses left breast [No Nipple Retraction] : no left nipple retraction [No Nipple Discharge] : no left nipple discharge [No Axillary Lymphadenopathy] : no left axillary lymphadenopathy [No Edema] : no edema [No Rashes] : no rashes [No Ulceration] : no ulceration [Breast Nipple Retraction] : nipples not retracted [Breast Nipple Inversion] : nipples not inverted [Breast Abnormal Lactation (Galactorrhea)] : no galactorrhea [Breast Nipple Fissures] : nipples not fissured [Breast Nipple Flattening] : nipples not flattened [Breast Abnormal Secretion Serous Fluid] : no serous discharge [Breast Abnormal Secretion Bloody Fluid] : no bloody discharge [Breast Abnormal Secretion Opalescent Fluid] : no milky discharge [de-identified] : No supraclavicular or axillary adenopathy. No dominant masses, normal to palpation. Everted nipple without discharge. No skin changes.\par \par  [de-identified] : No supraclavicular or axillary adenopathy. No dominant masses, normal to palpation. Everted nipple without discharge. No skin changes.\par \par

## 2019-07-22 ENCOUNTER — APPOINTMENT (OUTPATIENT)
Dept: SURGERY | Facility: CLINIC | Age: 39
End: 2019-07-22

## 2019-07-25 ENCOUNTER — RESULT REVIEW (OUTPATIENT)
Age: 39
End: 2019-07-25

## 2019-07-25 ENCOUNTER — OUTPATIENT (OUTPATIENT)
Dept: OUTPATIENT SERVICES | Facility: HOSPITAL | Age: 39
LOS: 1 days | End: 2019-07-25
Payer: COMMERCIAL

## 2019-07-25 ENCOUNTER — APPOINTMENT (OUTPATIENT)
Dept: MRI IMAGING | Facility: CLINIC | Age: 39
End: 2019-07-25
Payer: COMMERCIAL

## 2019-07-25 DIAGNOSIS — Z86.711 PERSONAL HISTORY OF PULMONARY EMBOLISM: Chronic | ICD-10-CM

## 2019-07-25 DIAGNOSIS — R93.89 ABNORMAL FINDINGS ON DIAGNOSTIC IMAGING OF OTHER SPECIFIED BODY STRUCTURES: ICD-10-CM

## 2019-07-25 DIAGNOSIS — Z98.89 OTHER SPECIFIED POSTPROCEDURAL STATES: Chronic | ICD-10-CM

## 2019-07-25 DIAGNOSIS — R93.3 ABNORMAL FINDINGS ON DIAGNOSTIC IMAGING OF OTHER PARTS OF DIGESTIVE TRACT: Chronic | ICD-10-CM

## 2019-07-25 DIAGNOSIS — Z96.22 MYRINGOTOMY TUBE(S) STATUS: Chronic | ICD-10-CM

## 2019-07-25 DIAGNOSIS — Z00.00 ENCOUNTER FOR GENERAL ADULT MEDICAL EXAMINATION WITHOUT ABNORMAL FINDINGS: ICD-10-CM

## 2019-07-25 DIAGNOSIS — C53.9 MALIGNANT NEOPLASM OF CERVIX UTERI, UNSPECIFIED: Chronic | ICD-10-CM

## 2019-07-25 PROCEDURE — 19085 BX BREAST 1ST LESION MR IMAG: CPT

## 2019-07-25 PROCEDURE — 77065 DX MAMMO INCL CAD UNI: CPT

## 2019-07-25 PROCEDURE — A9585: CPT

## 2019-07-25 PROCEDURE — A4648: CPT

## 2019-07-25 PROCEDURE — 88305 TISSUE EXAM BY PATHOLOGIST: CPT | Mod: 26

## 2019-07-25 PROCEDURE — 88305 TISSUE EXAM BY PATHOLOGIST: CPT

## 2019-07-25 PROCEDURE — 77065 DX MAMMO INCL CAD UNI: CPT | Mod: 26,LT

## 2019-07-25 PROCEDURE — 19085 BX BREAST 1ST LESION MR IMAG: CPT | Mod: LT

## 2019-08-05 ENCOUNTER — APPOINTMENT (OUTPATIENT)
Dept: SURGERY | Facility: CLINIC | Age: 39
End: 2019-08-05
Payer: COMMERCIAL

## 2019-08-05 VITALS
WEIGHT: 187 LBS | HEART RATE: 93 BPM | HEIGHT: 64.69 IN | BODY MASS INDEX: 31.54 KG/M2 | SYSTOLIC BLOOD PRESSURE: 135 MMHG | DIASTOLIC BLOOD PRESSURE: 85 MMHG

## 2019-08-05 DIAGNOSIS — D24.2 BENIGN NEOPLASM OF LEFT BREAST: ICD-10-CM

## 2019-08-05 DIAGNOSIS — R59.0 LOCALIZED ENLARGED LYMPH NODES: ICD-10-CM

## 2019-08-05 DIAGNOSIS — N64.89 OTHER SPECIFIED DISORDERS OF BREAST: ICD-10-CM

## 2019-08-05 PROCEDURE — 99214 OFFICE O/P EST MOD 30 MIN: CPT

## 2019-08-05 NOTE — ASSESSMENT
[FreeTextEntry1] : 39 yo premenopausal female with a history of cervical cancer presents for clinical breast follow up to review recent biopsy results of the left breast and genetic test results.  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 Left diagnostic mammogram/ultrasound 11/2019\par 2. Clinical examination in 6 months 2/2020\par 3. Follow up with Gyn-Onc

## 2019-08-05 NOTE — HISTORY OF PRESENT ILLNESS
[FreeTextEntry1] : I had the pleasure of seeing TRICIA LEWIS in the office today for a follow up clinical breast examination and review of recent left breast biopsy and genetic test results.  Tricia is a marco 39 yo premenopausal female who is accompanied by her mother and her niece.\par \par Ms. 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 She has a history of abnormal pap smear in 2008, positive HPV. In February 2013, she had a LEEP procedure which showed invasive squamous cell carcinoma of the cervix in all 4 quadrants of endocervix. She subsequently had a PET CT which showed positive bilateral common iliac nodes and a node at aortic bifurcation and external iliac nodes. She subsequently had exploratory lap on 3/25/13, and was ultimately found to have metastatic disease to the left internal iliac and right common iliac node. She subsequently received definitive chemoradiation with weekly cisplatin completed in June 2013. She then received 2800 cGY HDR intracavitary brachytherapy completed in July 2013. Her course was complicated by DVT and large central pulmonary embolism, cardiogenic shock, and subsequently had embolectomy on 6/18/13. She remains on warfarin. \par \par Last surveillance PET CT on 2/19/2016 showed BRANDI. She last had a colposcopy on 7/18/16 by Dr. Vale Gavin which showed HGSIL. She was told to use Aldara which she last used in Summer 2016. \par \par She has not seen a physician at [facility] for 1 year. She reports her weight fluctuates. She reports a poor appetite. She has chronic pelvic pain at rest. Denies any vaginal discharge. No vaginal bleeding. She occasionally has hematuria. \par \par She denies dominant breast mass, skin changes or nipple discharge. She recently underwent abnormal breast imaging and left breast biopsy was performed demonstrating fibroadenoma and PASH; pseudo-angiomatous stromal hyperplasia.\par \par Imaging [facility] 5/16/2019\par 5/16/2019 MG MAMMO SCREEN W RAKAN BI\par Impression: Asymmetry in the outer posterior left breast. Recommend additional left diagnostic mammographic views and possible left breast ultrasound for further evaluation. BIRADS 0 incomplete\par \par 6/12/2019 MRI of the breast: Thick linear non mass enhancement of left breast; biopsy recommended BIRADS 4A\par 7/25/2019 - Biopsy of left breast with post procedure mammogram\par \par Pathology: 7/25/2019\par Left breast, central MRI enhancement, needle core biopsy \par -Fibroadenomatoid nodules and pseudoangiomatous stromal hyperplasia. \par \par She understands that findings on biopsy are benign and recommendation is for continued monitoring.\par \par We reviewed results of [employer] genetic test which is negative however it demonstrates a VUS in the HILARY gene.  I thoroughly reviewed this information and she understands the company [employer] will inform us when the clarification is complete, however there is no evidence of genetic mutation.\par \par She understands and agrees to plan.\par All questions were answered. \par \par

## 2019-08-05 NOTE — PHYSICAL EXAM
[Atraumatic] : atraumatic [Normocephalic] : normocephalic [EOMI] : extra ocular movement intact [PERRL] : pupils equal, round and reactive to light [Sclera nonicteric] : sclera nonicteric [Supple] : supple [Examined in the supine and seated position] : examined in the supine and seated position [No Supraclavicular Adenopathy] : no supraclavicular adenopathy [No dominant masses] : no dominant masses in right breast  [No dominant masses] : no dominant masses left breast [No Nipple Retraction] : no right nipple retraction [No Nipple Discharge] : no left nipple discharge [No Axillary Lymphadenopathy] : no left axillary lymphadenopathy [No Edema] : no edema [No Rashes] : no rashes [No Ulceration] : no ulceration [Breast Nipple Inversion] : nipples not inverted [Breast Nipple Retraction] : nipples not retracted [Breast Nipple Flattening] : nipples not flattened [Breast Nipple Fissures] : nipples not fissured [Breast Abnormal Lactation (Galactorrhea)] : no galactorrhea [Breast Abnormal Secretion Bloody Fluid] : no bloody discharge [Breast Abnormal Secretion Opalescent Fluid] : no milky discharge [Breast Abnormal Secretion Serous Fluid] : no serous discharge [de-identified] : No supraclavicular or axillary adenopathy. No dominant masses, normal to palpation. Everted nipple without discharge. No skin changes.  [de-identified] : No supraclavicular or axillary adenopathy. No dominant masses, normal to palpation. Everted nipple without discharge. No skin changes.

## 2019-08-17 ENCOUNTER — TRANSCRIPTION ENCOUNTER (OUTPATIENT)
Age: 39
End: 2019-08-17

## 2019-08-23 ENCOUNTER — APPOINTMENT (OUTPATIENT)
Dept: GYNECOLOGIC ONCOLOGY | Facility: CLINIC | Age: 39
End: 2019-08-23

## 2019-09-20 ENCOUNTER — OUTPATIENT (OUTPATIENT)
Dept: OUTPATIENT SERVICES | Facility: HOSPITAL | Age: 39
LOS: 1 days | End: 2019-09-20
Payer: COMMERCIAL

## 2019-09-20 ENCOUNTER — APPOINTMENT (OUTPATIENT)
Dept: RADIOLOGY | Facility: CLINIC | Age: 39
End: 2019-09-20
Payer: COMMERCIAL

## 2019-09-20 DIAGNOSIS — Z98.89 OTHER SPECIFIED POSTPROCEDURAL STATES: Chronic | ICD-10-CM

## 2019-09-20 DIAGNOSIS — R93.3 ABNORMAL FINDINGS ON DIAGNOSTIC IMAGING OF OTHER PARTS OF DIGESTIVE TRACT: Chronic | ICD-10-CM

## 2019-09-20 DIAGNOSIS — C53.9 MALIGNANT NEOPLASM OF CERVIX UTERI, UNSPECIFIED: Chronic | ICD-10-CM

## 2019-09-20 DIAGNOSIS — R05 COUGH: ICD-10-CM

## 2019-09-20 DIAGNOSIS — Z96.22 MYRINGOTOMY TUBE(S) STATUS: Chronic | ICD-10-CM

## 2019-09-20 DIAGNOSIS — Z86.711 PERSONAL HISTORY OF PULMONARY EMBOLISM: Chronic | ICD-10-CM

## 2019-09-20 PROCEDURE — 71046 X-RAY EXAM CHEST 2 VIEWS: CPT | Mod: 26

## 2019-09-20 PROCEDURE — 71046 X-RAY EXAM CHEST 2 VIEWS: CPT

## 2019-10-16 ENCOUNTER — OUTPATIENT (OUTPATIENT)
Dept: OUTPATIENT SERVICES | Facility: HOSPITAL | Age: 39
LOS: 1 days | End: 2019-10-16
Payer: COMMERCIAL

## 2019-10-16 ENCOUNTER — APPOINTMENT (OUTPATIENT)
Dept: ULTRASOUND IMAGING | Facility: CLINIC | Age: 39
End: 2019-10-16
Payer: COMMERCIAL

## 2019-10-16 DIAGNOSIS — Z98.89 OTHER SPECIFIED POSTPROCEDURAL STATES: Chronic | ICD-10-CM

## 2019-10-16 DIAGNOSIS — Z00.00 ENCOUNTER FOR GENERAL ADULT MEDICAL EXAMINATION WITHOUT ABNORMAL FINDINGS: ICD-10-CM

## 2019-10-16 DIAGNOSIS — Z96.22 MYRINGOTOMY TUBE(S) STATUS: Chronic | ICD-10-CM

## 2019-10-16 DIAGNOSIS — C53.9 MALIGNANT NEOPLASM OF CERVIX UTERI, UNSPECIFIED: Chronic | ICD-10-CM

## 2019-10-16 DIAGNOSIS — Z86.711 PERSONAL HISTORY OF PULMONARY EMBOLISM: Chronic | ICD-10-CM

## 2019-10-16 DIAGNOSIS — R93.3 ABNORMAL FINDINGS ON DIAGNOSTIC IMAGING OF OTHER PARTS OF DIGESTIVE TRACT: Chronic | ICD-10-CM

## 2019-10-16 PROCEDURE — 76700 US EXAM ABDOM COMPLETE: CPT | Mod: 26

## 2019-10-16 PROCEDURE — 76700 US EXAM ABDOM COMPLETE: CPT

## 2019-10-25 ENCOUNTER — EMERGENCY (EMERGENCY)
Facility: HOSPITAL | Age: 39
LOS: 1 days | Discharge: HOME | End: 2019-10-25
Attending: EMERGENCY MEDICINE
Payer: COMMERCIAL

## 2019-10-25 VITALS
DIASTOLIC BLOOD PRESSURE: 85 MMHG | WEIGHT: 186.07 LBS | HEART RATE: 54 BPM | OXYGEN SATURATION: 95 % | TEMPERATURE: 98 F | SYSTOLIC BLOOD PRESSURE: 136 MMHG | HEIGHT: 64 IN | RESPIRATION RATE: 16 BRPM

## 2019-10-25 VITALS
TEMPERATURE: 99 F | OXYGEN SATURATION: 97 % | HEART RATE: 71 BPM | DIASTOLIC BLOOD PRESSURE: 80 MMHG | RESPIRATION RATE: 16 BRPM | SYSTOLIC BLOOD PRESSURE: 140 MMHG

## 2019-10-25 DIAGNOSIS — Z96.22 MYRINGOTOMY TUBE(S) STATUS: Chronic | ICD-10-CM

## 2019-10-25 DIAGNOSIS — R93.3 ABNORMAL FINDINGS ON DIAGNOSTIC IMAGING OF OTHER PARTS OF DIGESTIVE TRACT: Chronic | ICD-10-CM

## 2019-10-25 DIAGNOSIS — Z98.89 OTHER SPECIFIED POSTPROCEDURAL STATES: Chronic | ICD-10-CM

## 2019-10-25 DIAGNOSIS — Z86.711 PERSONAL HISTORY OF PULMONARY EMBOLISM: Chronic | ICD-10-CM

## 2019-10-25 DIAGNOSIS — C53.9 MALIGNANT NEOPLASM OF CERVIX UTERI, UNSPECIFIED: Chronic | ICD-10-CM

## 2019-10-25 LAB
ALBUMIN SERPL ELPH-MCNC: 4.4 G/DL — SIGNIFICANT CHANGE UP (ref 3.3–5.2)
ALP SERPL-CCNC: 87 U/L — SIGNIFICANT CHANGE UP (ref 40–120)
ALT FLD-CCNC: 23 U/L — SIGNIFICANT CHANGE UP
ANION GAP SERPL CALC-SCNC: 14 MMOL/L — SIGNIFICANT CHANGE UP (ref 5–17)
AST SERPL-CCNC: 22 U/L — SIGNIFICANT CHANGE UP
BASOPHILS # BLD AUTO: 0.02 K/UL — SIGNIFICANT CHANGE UP (ref 0–0.2)
BASOPHILS NFR BLD AUTO: 0.2 % — SIGNIFICANT CHANGE UP (ref 0–2)
BILIRUB SERPL-MCNC: 0.3 MG/DL — LOW (ref 0.4–2)
BUN SERPL-MCNC: 14 MG/DL — SIGNIFICANT CHANGE UP (ref 8–20)
CALCIUM SERPL-MCNC: 9.7 MG/DL — SIGNIFICANT CHANGE UP (ref 8.6–10.2)
CHLORIDE SERPL-SCNC: 100 MMOL/L — SIGNIFICANT CHANGE UP (ref 98–107)
CO2 SERPL-SCNC: 24 MMOL/L — SIGNIFICANT CHANGE UP (ref 22–29)
CREAT SERPL-MCNC: 0.87 MG/DL — SIGNIFICANT CHANGE UP (ref 0.5–1.3)
EOSINOPHIL # BLD AUTO: 0 K/UL — SIGNIFICANT CHANGE UP (ref 0–0.5)
EOSINOPHIL NFR BLD AUTO: 0 % — SIGNIFICANT CHANGE UP (ref 0–6)
GLUCOSE SERPL-MCNC: 159 MG/DL — HIGH (ref 70–115)
HCG SERPL-ACNC: <4 MIU/ML — SIGNIFICANT CHANGE UP
HCT VFR BLD CALC: 40.2 % — SIGNIFICANT CHANGE UP (ref 34.5–45)
HGB BLD-MCNC: 12.4 G/DL — SIGNIFICANT CHANGE UP (ref 11.5–15.5)
IMM GRANULOCYTES NFR BLD AUTO: 0.3 % — SIGNIFICANT CHANGE UP (ref 0–1.5)
LIDOCAIN IGE QN: 27 U/L — SIGNIFICANT CHANGE UP (ref 22–51)
LYMPHOCYTES # BLD AUTO: 1.17 K/UL — SIGNIFICANT CHANGE UP (ref 1–3.3)
LYMPHOCYTES # BLD AUTO: 10.7 % — LOW (ref 13–44)
MCHC RBC-ENTMCNC: 26.4 PG — LOW (ref 27–34)
MCHC RBC-ENTMCNC: 30.8 GM/DL — LOW (ref 32–36)
MCV RBC AUTO: 85.5 FL — SIGNIFICANT CHANGE UP (ref 80–100)
MONOCYTES # BLD AUTO: 0.33 K/UL — SIGNIFICANT CHANGE UP (ref 0–0.9)
MONOCYTES NFR BLD AUTO: 3 % — SIGNIFICANT CHANGE UP (ref 2–14)
NEUTROPHILS # BLD AUTO: 9.42 K/UL — HIGH (ref 1.8–7.4)
NEUTROPHILS NFR BLD AUTO: 85.8 % — HIGH (ref 43–77)
PLATELET # BLD AUTO: 322 K/UL — SIGNIFICANT CHANGE UP (ref 150–400)
POTASSIUM SERPL-MCNC: 4.4 MMOL/L — SIGNIFICANT CHANGE UP (ref 3.5–5.3)
POTASSIUM SERPL-SCNC: 4.4 MMOL/L — SIGNIFICANT CHANGE UP (ref 3.5–5.3)
PROT SERPL-MCNC: 7.9 G/DL — SIGNIFICANT CHANGE UP (ref 6.6–8.7)
RBC # BLD: 4.7 M/UL — SIGNIFICANT CHANGE UP (ref 3.8–5.2)
RBC # FLD: 13.2 % — SIGNIFICANT CHANGE UP (ref 10.3–14.5)
SODIUM SERPL-SCNC: 138 MMOL/L — SIGNIFICANT CHANGE UP (ref 135–145)
WBC # BLD: 10.97 K/UL — HIGH (ref 3.8–10.5)
WBC # FLD AUTO: 10.97 K/UL — HIGH (ref 3.8–10.5)

## 2019-10-25 PROCEDURE — 85730 THROMBOPLASTIN TIME PARTIAL: CPT

## 2019-10-25 PROCEDURE — 36415 COLL VENOUS BLD VENIPUNCTURE: CPT

## 2019-10-25 PROCEDURE — 84702 CHORIONIC GONADOTROPIN TEST: CPT

## 2019-10-25 PROCEDURE — 83690 ASSAY OF LIPASE: CPT

## 2019-10-25 PROCEDURE — 99284 EMERGENCY DEPT VISIT MOD MDM: CPT

## 2019-10-25 PROCEDURE — 99284 EMERGENCY DEPT VISIT MOD MDM: CPT | Mod: 25

## 2019-10-25 PROCEDURE — 85610 PROTHROMBIN TIME: CPT

## 2019-10-25 PROCEDURE — 85027 COMPLETE CBC AUTOMATED: CPT

## 2019-10-25 PROCEDURE — 80053 COMPREHEN METABOLIC PANEL: CPT

## 2019-10-25 PROCEDURE — 96374 THER/PROPH/DIAG INJ IV PUSH: CPT

## 2019-10-25 PROCEDURE — 96375 TX/PRO/DX INJ NEW DRUG ADDON: CPT

## 2019-10-25 RX ORDER — SODIUM CHLORIDE 9 MG/ML
1000 INJECTION INTRAMUSCULAR; INTRAVENOUS; SUBCUTANEOUS ONCE
Refills: 0 | Status: COMPLETED | OUTPATIENT
Start: 2019-10-25 | End: 2019-10-25

## 2019-10-25 RX ORDER — ONDANSETRON 8 MG/1
1 TABLET, FILM COATED ORAL
Qty: 15 | Refills: 0
Start: 2019-10-25 | End: 2019-10-27

## 2019-10-25 RX ORDER — SODIUM CHLORIDE 9 MG/ML
1000 INJECTION, SOLUTION INTRAVENOUS
Refills: 0 | Status: DISCONTINUED | OUTPATIENT
Start: 2019-10-25 | End: 2019-11-04

## 2019-10-25 RX ORDER — METOCLOPRAMIDE HCL 10 MG
10 TABLET ORAL ONCE
Refills: 0 | Status: COMPLETED | OUTPATIENT
Start: 2019-10-25 | End: 2019-10-25

## 2019-10-25 RX ORDER — HALOPERIDOL DECANOATE 100 MG/ML
2.5 INJECTION INTRAMUSCULAR ONCE
Refills: 0 | Status: COMPLETED | OUTPATIENT
Start: 2019-10-25 | End: 2019-10-25

## 2019-10-25 RX ORDER — ONDANSETRON 8 MG/1
4 TABLET, FILM COATED ORAL ONCE
Refills: 0 | Status: COMPLETED | OUTPATIENT
Start: 2019-10-25 | End: 2019-10-25

## 2019-10-25 RX ADMIN — Medication 10 MILLIGRAM(S): at 14:37

## 2019-10-25 RX ADMIN — HALOPERIDOL DECANOATE 2.5 MILLIGRAM(S): 100 INJECTION INTRAMUSCULAR at 17:36

## 2019-10-25 RX ADMIN — ONDANSETRON 4 MILLIGRAM(S): 8 TABLET, FILM COATED ORAL at 12:35

## 2019-10-25 RX ADMIN — SODIUM CHLORIDE 1000 MILLILITER(S): 9 INJECTION INTRAMUSCULAR; INTRAVENOUS; SUBCUTANEOUS at 12:35

## 2019-10-25 RX ADMIN — SODIUM CHLORIDE 125 MILLILITER(S): 9 INJECTION, SOLUTION INTRAVENOUS at 18:30

## 2019-10-25 NOTE — ED STATDOCS - PROGRESS NOTE DETAILS
Pt moved form intake Room. Pt seen and evaluated by intake Physician. HPI, Physical examination performed by intake Physician . Note reviewed and followup examination performed by me consistent with initial assessment. Agrees with intake Physician plan and tests. Pt still vomiting and Reglan ordered . Pt slept for an hour and got up complaining of nausea. Pt denies any abdominal pain at this time. Pt prescribed Haldol and after medication pt not vomiting and went ot re-evaluate pt was eating a cracker. D5 LR ordered @ 125/hr. Pt stable and no vomiting at this time. Pt facial color and neck looks good. Pt has no abdominal pain on reassessment. Pt had a total of 450ml D5LR . Pt speaking to me and tolerated Po intake well prior to D/C from ED.

## 2019-10-25 NOTE — ED ADULT NURSE NOTE - OBJECTIVE STATEMENT
37yo female c/o vomiting x 2 days. pt states started last night was eating minimally and "cannot stop" pt denies taking anything for pain or vomiting. denies fevers, chills, diarrhea, urinary symptoms. pt curled up in ball with bowl from home. reports being on coumadin and "thought she threw up blood" pt noted to be dry heaving, no visible vomit or blood in basin. no s/s active bleeding

## 2019-10-25 NOTE — ED STATDOCS - CARE PLAN
Principal Discharge DX:	Nausea and vomiting  Assessment and plan of treatment:	Continue with Medication as prescribed   F/U with Gastroenteritis as discussed

## 2019-10-25 NOTE — ED STATDOCS - PATIENT PORTAL LINK FT
You can access the FollowMyHealth Patient Portal offered by [facility] by registering at the following website: [email or web address]. By joining [facility]’s FollowMyHealth portal, you will also be able to view your health information using other applications (apps) compatible with our system.

## 2019-10-25 NOTE — ED STATDOCS - ATTENDING CONTRIBUTION TO CARE
Deana: I performed a face to face bedside interview with patient regarding history of present illness, review of symptoms and past medical history. I completed an independent physical exam and ordered tests/medications as needed.  I have discussed patient's plan of care with advanced care provider. The advanced care provider assisted in  executing the discussed plan. I was available for any questions or issues that may have arose during the execution of the plan of care. Deana: I performed a face to face bedside interview with patient regarding history of present illness, review of symptoms and past medical history. I completed an independent physical exam and ordered tests/medications as needed.  I have discussed patient's plan of care with advanced care provider. The advanced care provider assisted in  executing the discussed plan. I was available for any questions or issues that may have arose during the execution of the plan of care .

## 2019-10-25 NOTE — ED STATDOCS - CLINICAL SUMMARY MEDICAL DECISION MAKING FREE TEXT BOX
Pt with recurrent episode of multiple episodes of non-bloody emesis a/w epigastric tenderness and LUQ pain. Concern for gastritis vs cyclic emesis. Plan for labs, IVF, anti-emetics prn. Consider haldol if zofran not helping. Reassess. Needs GI referral if improved.

## 2019-10-25 NOTE — ED STATDOCS - PHYSICAL EXAMINATION
Gen: Well appearing in NAD  Head: NC/AT  Neck: trachea midline  Resp:  No distress  Ext: no deformities  Abd: TTP Epigastric   Neuro:  A&O appears non focal  Skin:  Warm and dry as visualized  Psych:  Normal affect and mood Gen: Pt vomiting throughout history. In Moderate distress.   Head: NC/AT  Neck: trachea midline  Resp:  No distress, CTAB  CV: RRR  GI: Soft, non-distended, mild epigastric ttp. No guarding/rebound  Ext: no deformities  Neuro:  A&O appears non focal  Skin:  Warm and dry as visualized  Psych:  Normal affect and mood

## 2019-10-25 NOTE — ED STATDOCS - OBJECTIVE STATEMENT
39y/o F with PMHx of DVT, PE, Cervical CA and PSHx of Cholecystectomy, Hernia repeat and Appendectomy presents to the ED, with boyfriend at bedside c/o emesis, onset 10pm last night with abdominal pain and chills. Symptoms are improved after taking a hot shower. Boyfriend reports that patient ate pot roast last night prior to sx onset. Boyfriend at bedside reports that patient has sx of intermittent vomiting and diarrhea, onset since Chemo radiation. Pt had h/o emesis and upper abdominal pain 1 week ago after eating fried food, alleviated with time and decreased appetite. Denies streaks of blood in emesis. Denies recent sick contact or use of marijuana. Denies dysuria, hematuria, constipation or chills. No additional complaints at this time. 39y/o F with PMHx of DVT/PE on coumadin, Cervical CA in remission and PSHx of Cholecystectomy, Hernia and Appendectomy presents to the ED, with boyfriend at bedside c/o emesis, onset 10pm last night with LUQ/epigastric abdominal pain and chills. Symptoms are improved after taking a hot shower. Boyfriend reports that patient ate pot roast last night prior to sx onset.  Pt had similar episode of emesis and upper abdominal pain 1 week ago after eating fried food, alleviated with time and decreased appetite. Denies streaks of blood in emesis. Denies recent sick contact or use of marijuana. Denies dysuria, hematuria, constipation, black/bloody stools. Last BM yesterday, was normal.

## 2019-10-25 NOTE — ED ADULT TRIAGE NOTE - NS ED NURSE DIRECT TO ROOM YN
Naltrexone/Bupropion (Contrave) - (By mouth)   Why this medicine is used:   Used with diet and exercise to help you lose weight. Contact a nurse or doctor right away if you have:  · Blistering, peeling, or red skin rash  · Fast, slow, or pounding heartbeat, chest pain, trouble breathing  · Thoughts of hurting yourself, depression, agitation or confusion, increased energy  · Seeing or hearing things that are not there, racing thoughts, trouble sleeping  · Muscle or joint pain, fever with rash, seizures  · Dark urine or pale stools, yellow skin or eyes  · Eye pain, vision changes, seeing halos around lights  · Dry mouth, nausea, vomiting, loss of appetite, stomach pain, diarrhea, constipation   © 2017 [facility] Information is for End User's use only and may not be sold, redistributed or otherwise used for commercial purposes.
Yes

## 2019-10-30 ENCOUNTER — OUTPATIENT (OUTPATIENT)
Dept: OUTPATIENT SERVICES | Facility: HOSPITAL | Age: 39
LOS: 1 days | End: 2019-10-30
Payer: COMMERCIAL

## 2019-10-30 ENCOUNTER — APPOINTMENT (OUTPATIENT)
Dept: CT IMAGING | Facility: CLINIC | Age: 39
End: 2019-10-30
Payer: COMMERCIAL

## 2019-10-30 DIAGNOSIS — Z98.89 OTHER SPECIFIED POSTPROCEDURAL STATES: Chronic | ICD-10-CM

## 2019-10-30 DIAGNOSIS — Z96.22 MYRINGOTOMY TUBE(S) STATUS: Chronic | ICD-10-CM

## 2019-10-30 DIAGNOSIS — R31.0 GROSS HEMATURIA: ICD-10-CM

## 2019-10-30 DIAGNOSIS — Z86.711 PERSONAL HISTORY OF PULMONARY EMBOLISM: Chronic | ICD-10-CM

## 2019-10-30 DIAGNOSIS — C53.9 MALIGNANT NEOPLASM OF CERVIX UTERI, UNSPECIFIED: Chronic | ICD-10-CM

## 2019-10-30 DIAGNOSIS — R93.3 ABNORMAL FINDINGS ON DIAGNOSTIC IMAGING OF OTHER PARTS OF DIGESTIVE TRACT: Chronic | ICD-10-CM

## 2019-10-30 PROCEDURE — 74178 CT ABD&PLV WO CNTR FLWD CNTR: CPT

## 2019-10-30 PROCEDURE — 74178 CT ABD&PLV WO CNTR FLWD CNTR: CPT | Mod: 26

## 2019-11-08 ENCOUNTER — APPOINTMENT (OUTPATIENT)
Dept: GASTROENTEROLOGY | Facility: CLINIC | Age: 39
End: 2019-11-08
Payer: COMMERCIAL

## 2019-11-08 VITALS
DIASTOLIC BLOOD PRESSURE: 103 MMHG | HEIGHT: 64 IN | BODY MASS INDEX: 32.78 KG/M2 | OXYGEN SATURATION: 99 % | SYSTOLIC BLOOD PRESSURE: 152 MMHG | RESPIRATION RATE: 16 BRPM | HEART RATE: 87 BPM | WEIGHT: 192 LBS

## 2019-11-08 PROCEDURE — 99203 OFFICE O/P NEW LOW 30 MIN: CPT

## 2019-11-08 NOTE — ASSESSMENT
[FreeTextEntry1] : 38-year-old female with multiple GI issues at this time. She had an imaging study that showed a common bile duct stone. She needs a MRCP to confirm the presence of the stone; given the fact that her LFTs are normal. She also has gastroesophageal reflux disease and was started on omeprazole daily. We discussed the low acid diet and antireflux, lifestyle. She'll continue on Pepcid p.r.n. She will likely need an endoscopy. On her recent CT scan. She also had a various areas of thickening in her colon and she has episodes of fecal incontinence. I will start her on Citrucel daily and Imodium one to 2 tablets twice, daily. She'll likely need a colonoscopy at some point.

## 2019-11-08 NOTE — CONSULT LETTER
[Consult Letter:] : I had the pleasure of evaluating your patient, [unfilled]. [Dear  ___] : Dear  [unfilled], [Please see my note below.] : Please see my note below. [Consult Closing:] : Thank you very much for allowing me to participate in the care of this patient.  If you have any questions, please do not hesitate to contact me. [Sincerely,] : Sincerely, [FreeTextEntry3] : Tacos Montes M.D.

## 2019-11-08 NOTE — PHYSICAL EXAM
[General Appearance - Alert] : alert [General Appearance - In No Acute Distress] : in no acute distress [Sclera] : the sclera and conjunctiva were normal [PERRL With Normal Accommodation] : pupils were equal in size, round, and reactive to light [Extraocular Movements] : extraocular movements were intact [Outer Ear] : the ears and nose were normal in appearance [Oropharynx] : the oropharynx was normal [Neck Appearance] : the appearance of the neck was normal [Jugular Venous Distention Increased] : there was no jugular-venous distention [Neck Cervical Mass (___cm)] : no neck mass was observed [Thyroid Nodule] : there were no palpable thyroid nodules [Thyroid Diffuse Enlargement] : the thyroid was not enlarged [Auscultation Breath Sounds / Voice Sounds] : lungs were clear to auscultation bilaterally [Heart Sounds] : normal S1 and S2 [Heart Sounds Gallop] : no gallops [Heart Rate And Rhythm] : heart rate was normal and rhythm regular [Murmurs] : no murmurs [Heart Sounds Pericardial Friction Rub] : no pericardial rub [Abdomen Soft] : soft [Bowel Sounds] : normal bowel sounds [Abdomen Tenderness] : non-tender [No CVA Tenderness] : no ~M costovertebral angle tenderness [Abdomen Mass (___ Cm)] : no abdominal mass palpated [No Spinal Tenderness] : no spinal tenderness [Skin Color & Pigmentation] : normal skin color and pigmentation [Skin Turgor] : normal skin turgor [] : no rash [Impaired Insight] : insight and judgment were intact [Oriented To Time, Place, And Person] : oriented to person, place, and time [Affect] : the affect was normal

## 2019-11-08 NOTE — HISTORY OF PRESENT ILLNESS
[de-identified] : This is a 38-year-old female , who has a history of squamous cell cancer of the uterine/cervix, pulmonary embolus, IVC filter, cholecystectomy who presents with epigastric pain. She has a history of reflux. However, over the last 2-3 months. She has had intermittent episodes of severe epigastric pain associated with nausea. Her appetite is poor. She came to [facility], and had a CT scan her common bile duct was dilated to approximately 9 cm and she had a 2 mm filling defect noted in the distal common bile duct. Her liver tests were normal. The CT also revealed thickening in the rectum and sigmoid colon. She denies any mucus or rectal bleeding. She does have episodes of fecal incontinence, which have worsened over the last 2-3 months. She wears diapers. She has tried cholestyramine which doesn't work so she was taken off. She is here with her mother. She is currently on Coumadin.

## 2019-11-13 ENCOUNTER — FORM ENCOUNTER (OUTPATIENT)
Age: 39
End: 2019-11-13

## 2019-11-14 ENCOUNTER — APPOINTMENT (OUTPATIENT)
Dept: MRI IMAGING | Facility: CLINIC | Age: 39
End: 2019-11-14
Payer: COMMERCIAL

## 2019-11-14 ENCOUNTER — OUTPATIENT (OUTPATIENT)
Dept: OUTPATIENT SERVICES | Facility: HOSPITAL | Age: 39
LOS: 1 days | End: 2019-11-14
Payer: COMMERCIAL

## 2019-11-14 DIAGNOSIS — Z98.89 OTHER SPECIFIED POSTPROCEDURAL STATES: Chronic | ICD-10-CM

## 2019-11-14 DIAGNOSIS — R93.3 ABNORMAL FINDINGS ON DIAGNOSTIC IMAGING OF OTHER PARTS OF DIGESTIVE TRACT: Chronic | ICD-10-CM

## 2019-11-14 DIAGNOSIS — Z86.711 PERSONAL HISTORY OF PULMONARY EMBOLISM: Chronic | ICD-10-CM

## 2019-11-14 DIAGNOSIS — C53.9 MALIGNANT NEOPLASM OF CERVIX UTERI, UNSPECIFIED: Chronic | ICD-10-CM

## 2019-11-14 DIAGNOSIS — R93.5 ABNORMAL FINDINGS ON DIAGNOSTIC IMAGING OF OTHER ABDOMINAL REGIONS, INCLUDING RETROPERITONEUM: ICD-10-CM

## 2019-11-14 DIAGNOSIS — Z96.22 MYRINGOTOMY TUBE(S) STATUS: Chronic | ICD-10-CM

## 2019-11-14 DIAGNOSIS — K44.9 DIAPHRAGMATIC HERNIA WITHOUT OBSTRUCTION OR GANGRENE: ICD-10-CM

## 2019-11-14 PROCEDURE — 74181 MRI ABDOMEN W/O CONTRAST: CPT

## 2019-11-14 PROCEDURE — 74181 MRI ABDOMEN W/O CONTRAST: CPT | Mod: 26

## 2019-11-22 ENCOUNTER — APPOINTMENT (OUTPATIENT)
Dept: ULTRASOUND IMAGING | Facility: CLINIC | Age: 39
End: 2019-11-22

## 2019-11-22 ENCOUNTER — APPOINTMENT (OUTPATIENT)
Dept: MAMMOGRAPHY | Facility: CLINIC | Age: 39
End: 2019-11-22

## 2019-11-25 ENCOUNTER — APPOINTMENT (OUTPATIENT)
Dept: SURGERY | Facility: CLINIC | Age: 39
End: 2019-11-25

## 2019-12-02 ENCOUNTER — APPOINTMENT (OUTPATIENT)
Dept: GASTROENTEROLOGY | Facility: CLINIC | Age: 39
End: 2019-12-02
Payer: COMMERCIAL

## 2019-12-02 VITALS
HEIGHT: 64 IN | BODY MASS INDEX: 31.07 KG/M2 | WEIGHT: 182 LBS | SYSTOLIC BLOOD PRESSURE: 130 MMHG | RESPIRATION RATE: 15 BRPM | OXYGEN SATURATION: 98 % | DIASTOLIC BLOOD PRESSURE: 80 MMHG

## 2019-12-02 DIAGNOSIS — K80.50 CALCULUS OF BILE DUCT W/OUT CHOLANGITIS OR CHOLECYSTITIS W/OUT OBSTRUCTION: ICD-10-CM

## 2019-12-02 PROCEDURE — 99214 OFFICE O/P EST MOD 30 MIN: CPT

## 2019-12-02 NOTE — PHYSICAL EXAM
[General Appearance - In No Acute Distress] : in no acute distress [General Appearance - Alert] : alert [Sclera] : the sclera and conjunctiva were normal [PERRL With Normal Accommodation] : pupils were equal in size, round, and reactive to light [Outer Ear] : the ears and nose were normal in appearance [Oropharynx] : the oropharynx was normal [Extraocular Movements] : extraocular movements were intact [Neck Appearance] : the appearance of the neck was normal [Neck Cervical Mass (___cm)] : no neck mass was observed [Jugular Venous Distention Increased] : there was no jugular-venous distention [Thyroid Nodule] : there were no palpable thyroid nodules [Thyroid Diffuse Enlargement] : the thyroid was not enlarged [Auscultation Breath Sounds / Voice Sounds] : lungs were clear to auscultation bilaterally [Heart Sounds] : normal S1 and S2 [Heart Sounds Gallop] : no gallops [Heart Rate And Rhythm] : heart rate was normal and rhythm regular [Heart Sounds Pericardial Friction Rub] : no pericardial rub [Murmurs] : no murmurs [Bowel Sounds] : normal bowel sounds [Abdomen Soft] : soft [Abdomen Tenderness] : non-tender [No CVA Tenderness] : no ~M costovertebral angle tenderness [Abdomen Mass (___ Cm)] : no abdominal mass palpated [Skin Color & Pigmentation] : normal skin color and pigmentation [Skin Turgor] : normal skin turgor [No Spinal Tenderness] : no spinal tenderness [Impaired Insight] : insight and judgment were intact [] : no rash [Oriented To Time, Place, And Person] : oriented to person, place, and time [Affect] : the affect was normal

## 2019-12-02 NOTE — ASSESSMENT
[FreeTextEntry1] : 38-year-old female with choledocholithiasis. She also has gastroesophageal reflux disease and was started on omeprazole daily.  CT imaging showed a small hiatal hernia and distal esophageal thickening she will need an EGD also. On her CT scan she also had a various areas of thickening in her colon and she has episodes of fecal incontinence. I will continue her on Citrucel daily and Hold the Imodium. She'll likely need a colonoscopy at some point in the near future after the choledocholithiasis is addressed.

## 2019-12-02 NOTE — HISTORY OF PRESENT ILLNESS
[de-identified] : This is a 38-year-old female , who has a history of squamous cell cancer of the uterine/cervix, pulmonary embolus, IVC filter, cholecystectomy who presents with epigastric pain. She has a history of reflux. However, over the last 2-3 months. She has had intermittent episodes of severe epigastric pain associated with nausea. Her appetite is poor. She came to [facility], and had a CT scan her common bile duct was dilated to approximately 9 cm and she had a 2 mm filling defect noted in the distal common bile duct. Her liver tests were normal. The CT also revealed thickening in the rectum and sigmoid colon. She denies any mucus or rectal bleeding. She does have episodes of fecal incontinence, which have worsened over the last 2-3 months. She wears diapers. She has tried cholestyramine which doesn't work so she was taken off. She is here with her mother. She is currently on Coumadin. \par \par Follow up \par The MRCP showed a 6 mm stone. She had 2 episodes of pain associated with nausea and vomiting. She denies fever or chills. She took Citrucel and Imodium and she was constipated.

## 2019-12-18 ENCOUNTER — TRANSCRIPTION ENCOUNTER (OUTPATIENT)
Age: 39
End: 2019-12-18

## 2019-12-18 ENCOUNTER — FORM ENCOUNTER (OUTPATIENT)
Age: 39
End: 2019-12-18

## 2019-12-19 ENCOUNTER — OUTPATIENT (OUTPATIENT)
Dept: OUTPATIENT SERVICES | Facility: HOSPITAL | Age: 39
LOS: 1 days | End: 2019-12-19
Payer: COMMERCIAL

## 2019-12-19 ENCOUNTER — APPOINTMENT (OUTPATIENT)
Dept: GASTROENTEROLOGY | Facility: HOSPITAL | Age: 39
End: 2019-12-19

## 2019-12-19 DIAGNOSIS — C53.9 MALIGNANT NEOPLASM OF CERVIX UTERI, UNSPECIFIED: Chronic | ICD-10-CM

## 2019-12-19 DIAGNOSIS — R93.89 ABNORMAL FINDINGS ON DIAGNOSTIC IMAGING OF OTHER SPECIFIED BODY STRUCTURES: ICD-10-CM

## 2019-12-19 DIAGNOSIS — R93.3 ABNORMAL FINDINGS ON DIAGNOSTIC IMAGING OF OTHER PARTS OF DIGESTIVE TRACT: Chronic | ICD-10-CM

## 2019-12-19 DIAGNOSIS — K80.50 CALCULUS OF BILE DUCT WITHOUT CHOLANGITIS OR CHOLECYSTITIS WITHOUT OBSTRUCTION: ICD-10-CM

## 2019-12-19 DIAGNOSIS — Z98.89 OTHER SPECIFIED POSTPROCEDURAL STATES: Chronic | ICD-10-CM

## 2019-12-19 DIAGNOSIS — R93.5 ABNORMAL FINDINGS ON DIAGNOSTIC IMAGING OF OTHER ABDOMINAL REGIONS, INCLUDING RETROPERITONEUM: ICD-10-CM

## 2019-12-19 DIAGNOSIS — Z96.22 MYRINGOTOMY TUBE(S) STATUS: Chronic | ICD-10-CM

## 2019-12-19 DIAGNOSIS — Z86.711 PERSONAL HISTORY OF PULMONARY EMBOLISM: Chronic | ICD-10-CM

## 2019-12-19 PROCEDURE — 43264 ERCP REMOVE DUCT CALCULI: CPT

## 2019-12-19 PROCEDURE — 74330 X-RAY BILE/PANC ENDOSCOPY: CPT

## 2019-12-19 PROCEDURE — 43262 ENDO CHOLANGIOPANCREATOGRAPH: CPT

## 2019-12-19 PROCEDURE — C1769: CPT

## 2019-12-19 PROCEDURE — C1773: CPT

## 2020-01-20 ENCOUNTER — FORM ENCOUNTER (OUTPATIENT)
Age: 40
End: 2020-01-20

## 2020-01-21 ENCOUNTER — APPOINTMENT (OUTPATIENT)
Dept: ULTRASOUND IMAGING | Facility: CLINIC | Age: 40
End: 2020-01-21
Payer: COMMERCIAL

## 2020-01-21 ENCOUNTER — APPOINTMENT (OUTPATIENT)
Dept: MAMMOGRAPHY | Facility: CLINIC | Age: 40
End: 2020-01-21
Payer: COMMERCIAL

## 2020-01-21 ENCOUNTER — OUTPATIENT (OUTPATIENT)
Dept: OUTPATIENT SERVICES | Facility: HOSPITAL | Age: 40
LOS: 1 days | End: 2020-01-21
Payer: COMMERCIAL

## 2020-01-21 DIAGNOSIS — Z98.89 OTHER SPECIFIED POSTPROCEDURAL STATES: Chronic | ICD-10-CM

## 2020-01-21 DIAGNOSIS — R93.3 ABNORMAL FINDINGS ON DIAGNOSTIC IMAGING OF OTHER PARTS OF DIGESTIVE TRACT: Chronic | ICD-10-CM

## 2020-01-21 DIAGNOSIS — Z96.22 MYRINGOTOMY TUBE(S) STATUS: Chronic | ICD-10-CM

## 2020-01-21 DIAGNOSIS — Z86.711 PERSONAL HISTORY OF PULMONARY EMBOLISM: Chronic | ICD-10-CM

## 2020-01-21 DIAGNOSIS — C53.9 MALIGNANT NEOPLASM OF CERVIX UTERI, UNSPECIFIED: Chronic | ICD-10-CM

## 2020-01-21 DIAGNOSIS — R92.8 OTHER ABNORMAL AND INCONCLUSIVE FINDINGS ON DIAGNOSTIC IMAGING OF BREAST: ICD-10-CM

## 2020-01-21 PROCEDURE — 77065 DX MAMMO INCL CAD UNI: CPT

## 2020-01-21 PROCEDURE — G0279: CPT | Mod: 26

## 2020-01-21 PROCEDURE — 77065 DX MAMMO INCL CAD UNI: CPT | Mod: 26,LT

## 2020-01-21 PROCEDURE — G0279: CPT

## 2020-01-31 ENCOUNTER — RX RENEWAL (OUTPATIENT)
Age: 40
End: 2020-01-31

## 2020-02-03 ENCOUNTER — TRANSCRIPTION ENCOUNTER (OUTPATIENT)
Age: 40
End: 2020-02-03

## 2020-02-10 ENCOUNTER — APPOINTMENT (OUTPATIENT)
Dept: SURGERY | Facility: CLINIC | Age: 40
End: 2020-02-10

## 2020-03-23 ENCOUNTER — TRANSCRIPTION ENCOUNTER (OUTPATIENT)
Age: 40
End: 2020-03-23

## 2020-03-25 ENCOUNTER — APPOINTMENT (OUTPATIENT)
Dept: GASTROENTEROLOGY | Facility: CLINIC | Age: 40
End: 2020-03-25

## 2020-04-28 ENCOUNTER — APPOINTMENT (OUTPATIENT)
Dept: GASTROENTEROLOGY | Facility: CLINIC | Age: 40
End: 2020-04-28
Payer: COMMERCIAL

## 2020-04-28 DIAGNOSIS — K58.9 IRRITABLE BOWEL SYNDROME W/OUT DIARRHEA: ICD-10-CM

## 2020-04-28 PROCEDURE — 99213 OFFICE O/P EST LOW 20 MIN: CPT | Mod: 95

## 2020-04-28 NOTE — HISTORY OF PRESENT ILLNESS
[Home] : at home, [unfilled] , at the time of the visit. [Medical Office: ([facility])___] : at the medical office located in  [de-identified] : This was a telehealth visit. Time spent was 15 min. \par \par This is a 38-year-old female , who has a history of squamous cell cancer of the uterine/cervix, pulmonary embolus, IVC filter, cholecystectomy who presents with epigastric pain. She has a history of reflux. However, over the last 2-3 months. She has had intermittent episodes of severe epigastric pain associated with nausea. Her appetite is poor. She came to [facility], and had a CT scan her common bile duct was dilated to approximately 9 cm and she had a 2 mm filling defect noted in the distal common bile duct. Her liver tests were normal. The CT also revealed thickening in the rectum and sigmoid colon. She denies any mucus or rectal bleeding. She does have episodes of fecal incontinence, which have worsened over the last 2-3 months. She wears diapers. She has tried cholestyramine which doesn't work so she was taken off. She is here with her mother. She is currently on Coumadin. \par The MRCP showed a 6 mm stone. She had an ERCP with removal of stones at [facility].\par \par Follow up \par She has had episodes of diarrhea and fecal incontinence. The fiber and Imodium combo is not helping. She has tried cholestyramine in the past. She has reflux symptoms.

## 2020-04-28 NOTE — PHYSICAL EXAM
[PERRL With Normal Accommodation] : pupils were equal in size, round, and reactive to light [Sclera] : the sclera and conjunctiva were normal [Outer Ear] : the ears and nose were normal in appearance [Extraocular Movements] : extraocular movements were intact [Oriented To Time, Place, And Person] : oriented to person, place, and time [Neck Appearance] : the appearance of the neck was normal [Skin Color & Pigmentation] : normal skin color and pigmentation [Impaired Insight] : insight and judgment were intact [Affect] : the affect was normal [Mood] : the mood was normal

## 2020-04-28 NOTE — ASSESSMENT
[FreeTextEntry1] : 38-year-old female with gastroesophageal reflux disease and was started on omeprazole daily.  She likely has IBS I will start her on dicyclomine and cholestyramine daily.  She had a CT a/p. She'll likely need a colonoscopy at some point in the near future.\par \par She was informed that we are not currently scheduling patients for procedures given the recent COVID pandemic. She was instructed to contact the office in 1 - 2 months.\par

## 2020-04-29 ENCOUNTER — TRANSCRIPTION ENCOUNTER (OUTPATIENT)
Age: 40
End: 2020-04-29

## 2020-04-30 LAB
BASOPHILS # BLD AUTO: 0.08 K/UL
BASOPHILS NFR BLD AUTO: 1 %
CRP SERPL-MCNC: 1.42 MG/DL
EOSINOPHIL # BLD AUTO: 0.26 K/UL
EOSINOPHIL NFR BLD AUTO: 3.2 %
HCT VFR BLD CALC: 41.8 %
HGB BLD-MCNC: 12.9 G/DL
IMM GRANULOCYTES NFR BLD AUTO: 0.2 %
LYMPHOCYTES # BLD AUTO: 3.33 K/UL
LYMPHOCYTES NFR BLD AUTO: 41.4 %
MAN DIFF?: NORMAL
MCHC RBC-ENTMCNC: 26.9 PG
MCHC RBC-ENTMCNC: 30.9 GM/DL
MCV RBC AUTO: 87.3 FL
MONOCYTES # BLD AUTO: 0.6 K/UL
MONOCYTES NFR BLD AUTO: 7.5 %
NEUTROPHILS # BLD AUTO: 3.76 K/UL
NEUTROPHILS NFR BLD AUTO: 46.7 %
PLATELET # BLD AUTO: 304 K/UL
RBC # BLD: 4.79 M/UL
RBC # FLD: 13.4 %
T4 FREE SERPL-MCNC: 1.2 NG/DL
WBC # FLD AUTO: 8.05 K/UL

## 2020-05-01 LAB
TTG IGA SER IA-ACNC: 1.3 U/ML
TTG IGA SER-ACNC: NEGATIVE
TTG IGG SER IA-ACNC: 4.9 U/ML
TTG IGG SER IA-ACNC: NEGATIVE

## 2020-05-05 LAB — CALPROTECTIN FECAL: 146 UG/G

## 2020-06-08 ENCOUNTER — RX CHANGE (OUTPATIENT)
Age: 40
End: 2020-06-08

## 2020-06-11 ENCOUNTER — APPOINTMENT (OUTPATIENT)
Dept: GASTROENTEROLOGY | Facility: CLINIC | Age: 40
End: 2020-06-11
Payer: COMMERCIAL

## 2020-06-11 VITALS
HEART RATE: 82 BPM | HEIGHT: 64 IN | WEIGHT: 194 LBS | OXYGEN SATURATION: 99 % | BODY MASS INDEX: 33.12 KG/M2 | DIASTOLIC BLOOD PRESSURE: 74 MMHG | RESPIRATION RATE: 16 BRPM | SYSTOLIC BLOOD PRESSURE: 126 MMHG

## 2020-06-11 VITALS — TEMPERATURE: 97.8 F

## 2020-06-11 DIAGNOSIS — R15.9 FULL INCONTINENCE OF FECES: ICD-10-CM

## 2020-06-11 PROCEDURE — 99214 OFFICE O/P EST MOD 30 MIN: CPT

## 2020-06-16 ENCOUNTER — APPOINTMENT (OUTPATIENT)
Dept: CARDIOLOGY | Facility: CLINIC | Age: 40
End: 2020-06-16

## 2020-06-28 ENCOUNTER — APPOINTMENT (OUTPATIENT)
Dept: DISASTER EMERGENCY | Facility: CLINIC | Age: 40
End: 2020-06-28

## 2020-06-29 LAB — SARS-COV-2 N GENE NPH QL NAA+PROBE: NOT DETECTED

## 2020-07-01 ENCOUNTER — RESULT REVIEW (OUTPATIENT)
Age: 40
End: 2020-07-01

## 2020-07-01 ENCOUNTER — APPOINTMENT (OUTPATIENT)
Dept: GASTROENTEROLOGY | Facility: GI CENTER | Age: 40
End: 2020-07-01
Payer: COMMERCIAL

## 2020-07-01 ENCOUNTER — OUTPATIENT (OUTPATIENT)
Dept: OUTPATIENT SERVICES | Facility: HOSPITAL | Age: 40
LOS: 1 days | End: 2020-07-01
Payer: COMMERCIAL

## 2020-07-01 DIAGNOSIS — K52.9 NONINFECTIVE GASTROENTERITIS AND COLITIS, UNSPECIFIED: ICD-10-CM

## 2020-07-01 DIAGNOSIS — R93.3 ABNORMAL FINDINGS ON DIAGNOSTIC IMAGING OF OTHER PARTS OF DIGESTIVE TRACT: Chronic | ICD-10-CM

## 2020-07-01 DIAGNOSIS — Z98.89 OTHER SPECIFIED POSTPROCEDURAL STATES: Chronic | ICD-10-CM

## 2020-07-01 DIAGNOSIS — Z86.711 PERSONAL HISTORY OF PULMONARY EMBOLISM: Chronic | ICD-10-CM

## 2020-07-01 DIAGNOSIS — Z96.22 MYRINGOTOMY TUBE(S) STATUS: Chronic | ICD-10-CM

## 2020-07-01 DIAGNOSIS — C53.9 MALIGNANT NEOPLASM OF CERVIX UTERI, UNSPECIFIED: Chronic | ICD-10-CM

## 2020-07-01 DIAGNOSIS — R19.5 OTHER FECAL ABNORMALITIES: ICD-10-CM

## 2020-07-01 PROCEDURE — 88342 IMHCHEM/IMCYTCHM 1ST ANTB: CPT | Mod: 26

## 2020-07-01 PROCEDURE — 88342 IMHCHEM/IMCYTCHM 1ST ANTB: CPT

## 2020-07-01 PROCEDURE — 45380 COLONOSCOPY AND BIOPSY: CPT

## 2020-07-01 PROCEDURE — 88305 TISSUE EXAM BY PATHOLOGIST: CPT | Mod: 26

## 2020-07-01 PROCEDURE — 43239 EGD BIOPSY SINGLE/MULTIPLE: CPT

## 2020-07-01 PROCEDURE — 88305 TISSUE EXAM BY PATHOLOGIST: CPT

## 2020-07-01 PROCEDURE — 43239 EGD BIOPSY SINGLE/MULTIPLE: CPT | Mod: 59

## 2020-07-01 NOTE — PROCEDURE
[GERD] : GERD [de-identified] : biopsied to r/o H. pylori  [de-identified] : Irregular Z line @ 31 cm\par 4 cm long hiatal hernia \par  [de-identified] : Third portion normal  [With Biopsy] : with biopsy [Chronic Diarrhea] : chronic diarrhea [Procedure Explained] : The procedure was explained [Allergies Reviewed] : allergies reviewed. [Risks] : Risks [Benefits] : benefits [Alternatives] : alternatives [Consent Obtained] : written consent was obtained prior to the procedure and is detailed in the patient's record [Patient] : the patient [Bowel Prep Kit] : the patient took the appropriate bowel preparation kit as directed [Approved Diet Followed] : the patient avoided solid foods and adhered to the approved diet list for 24 hours prior to the procedure [Automated Blood Pressure Cuff] : automated blood pressure cuff [Cardiac Monitor] : cardiac monitor [Pulse Oximeter] : pulse oximeter [Versed ___ mg IV] : Versed [unfilled] ~Umg intravenously [Propofol ___ mg IV] : Propofol [unfilled] ~Umg intravenously [3] : 3 [Prep Qualtiy: ___] : Prep Quality:  [unfilled] [Withdrawal Time: ___] : Withdrawal Time:  [unfilled] [Performed By: ___] : Performed by:  NICHOLAS [Left Lateral Decubitus] : The patient was positioned in the left lateral decubitus position [Abnormal Rectum] : a normal rectum [External Hemorrhoids] : no external hemorrhoids [Normal Prostate] : a normal prostate [Terminal Ileum via Ileocecal Valve] : and the terminal ileum was examined by entering the ileocecal valve [No Difficulty] : without difficulty [Insufflated] : insufflated [Retroflex View] : a retroflex view of the rectum was performed [Multiple Passes Needed] : after multiple passes [Normal] : Normal [Biopsy] : biopsy [Polyps] : polyps [Colitis Patchy] : colitis patchy [Proctitis (diffuse)] : Proctitis (diffuse) [Sent to Pathology] : was sent to pathology for analysis [Tolerated Well] : the patient tolerated the procedure well [Vital Signs Stable] : the vital signs were stable [de-identified] : Elevated CRP and abnormal fecal calprotectin [No Complications] : There were no complications [FreeTextEntry2] : [id number] [de-identified] : Normal TI for 5 cm  [de-identified] : mild patchy erythema  biopsied to r/o colitis  [de-identified] : Mild patchy erythema biopsied to r/o colitis [FreeTextEntry1] : Mild to moderate patchy erythema in the rectum and rectosigmoid junction 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 Biopsied to r/o IBD \par Small polyp removed\par \par Recommendations\par F/U pathology\par Follow up in the office in 2 weeks\par  [de-identified] : 5 mm semi sessile polyp removed with biopsy forceps\par \par Moderate Patchy erythema Biopsied [de-identified] : Moderate patchy erythema and telangiectasia likely secondary to radiation \par Biopsied to r/o IBD

## 2020-07-01 NOTE — PROCEDURE
[GERD] : GERD [de-identified] : Irregular Z line @ 31 cm\par 4 cm long hiatal hernia \par  [de-identified] : biopsied to r/o H. pylori  [de-identified] : Third portion normal  [With Biopsy] : with biopsy [Chronic Diarrhea] : chronic diarrhea [Procedure Explained] : The procedure was explained [Allergies Reviewed] : allergies reviewed. [Risks] : Risks [Benefits] : benefits [Alternatives] : alternatives [Consent Obtained] : written consent was obtained prior to the procedure and is detailed in the patient's record [Patient] : the patient [Bowel Prep Kit] : the patient took the appropriate bowel preparation kit as directed [Approved Diet Followed] : the patient avoided solid foods and adhered to the approved diet list for 24 hours prior to the procedure [Automated Blood Pressure Cuff] : automated blood pressure cuff [Cardiac Monitor] : cardiac monitor [Pulse Oximeter] : pulse oximeter [Versed ___ mg IV] : Versed [unfilled] ~Umg intravenously [Propofol ___ mg IV] : Propofol [unfilled] ~Umg intravenously [3] : 3 [Prep Qualtiy: ___] : Prep Quality:  [unfilled] [Withdrawal Time: ___] : Withdrawal Time:  [unfilled] [Performed By: ___] : Performed by:  NICHOLAS [Left Lateral Decubitus] : The patient was positioned in the left lateral decubitus position [Abnormal Rectum] : a normal rectum [External Hemorrhoids] : no external hemorrhoids [Normal Prostate] : a normal prostate [Terminal Ileum via Ileocecal Valve] : and the terminal ileum was examined by entering the ileocecal valve [No Difficulty] : without difficulty [Insufflated] : insufflated [Multiple Passes Needed] : after multiple passes [Retroflex View] : a retroflex view of the rectum was performed [Normal] : Normal [Biopsy] : biopsy [Polyps] : polyps [Colitis Patchy] : colitis patchy [Proctitis (diffuse)] : Proctitis (diffuse) [Vital Signs Stable] : the vital signs were stable [Tolerated Well] : the patient tolerated the procedure well [Sent to Pathology] : was sent to pathology for analysis [No Complications] : There were no complications [FreeTextEntry2] : [id number] [de-identified] : Elevated CRP and abnormal fecal calprotectin [de-identified] : Mild patchy erythema biopsied to r/o colitis [de-identified] : Normal TI for 5 cm  [de-identified] : mild patchy erythema  biopsied to r/o colitis  [FreeTextEntry1] : Mild to moderate patchy erythema in the rectum and rectosigmoid junction 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 Biopsied to r/o IBD \par Small polyp removed\par \par Recommendations\par F/U pathology\par Follow up in the office in 2 weeks\par  [de-identified] : Moderate patchy erythema and telangiectasia likely secondary to radiation \par Biopsied to r/o IBD [de-identified] : 5 mm semi sessile polyp removed with biopsy forceps\par \par Moderate Patchy erythema Biopsied

## 2020-07-01 NOTE — ASSESSMENT
[FreeTextEntry1] : 39-year-old woman, who presents with chronic diarrhea and abnormal fecal calprotectin and elevated CRP. I will schedule her for a colonoscopy to rule out IBD. She is currently on Lialda but takes it intermittently. She has a history of radiation proctitis. She's currently on Coumadin will need to that prior to the procedure. She is also having severe GERD symptoms. Schedule her for an EGD.

## 2020-07-01 NOTE — HISTORY OF PRESENT ILLNESS
[de-identified] : Is a 39-year-old woman with a history of cervical cancer, status post chemotherapy and radiation, IVC filter, pulmonary embolus who presents with diarrhea. She's a history of IBS. His multiple bowel movements a day. She denies any rectal bleeding. She was sent for several stool studies including fecal calprotectin, CRP, which were both abnormal. She's episodes of fecal incontinence. She's been taking Imodium p.r.n.. During the COVID pandemic I empirically started her on Lialda. When she takes the Nikita that she stays states her diarrhea is better. She had a particularly bad weekend and needed adult diapers to go out. She had a colonoscopy in 2013 or 14 which showed "radiation changes". This was done at [facility]. \par She also has severe GERD. She been on omeprazole twice daily, although she ran out, and has been off of it for few days. She has constant reflux symptoms off PPI. She's had episodes of nausea and vomiting. She denies any dysphagia. She has a history of a small hiatal hernia.

## 2020-07-01 NOTE — PROCEDURE
[GERD] : GERD [de-identified] : biopsied to r/o H. pylori  [de-identified] : Irregular Z line @ 31 cm\par 4 cm long hiatal hernia \par  [de-identified] : Third portion normal  [With Biopsy] : with biopsy [Chronic Diarrhea] : chronic diarrhea [Procedure Explained] : The procedure was explained [Allergies Reviewed] : allergies reviewed. [Risks] : Risks [Benefits] : benefits [Alternatives] : alternatives [Consent Obtained] : written consent was obtained prior to the procedure and is detailed in the patient's record [Patient] : the patient [Bowel Prep Kit] : the patient took the appropriate bowel preparation kit as directed [Approved Diet Followed] : the patient avoided solid foods and adhered to the approved diet list for 24 hours prior to the procedure [Automated Blood Pressure Cuff] : automated blood pressure cuff [Cardiac Monitor] : cardiac monitor [Pulse Oximeter] : pulse oximeter [Versed ___ mg IV] : Versed [unfilled] ~Umg intravenously [Propofol ___ mg IV] : Propofol [unfilled] ~Umg intravenously [3] : 3 [Prep Qualtiy: ___] : Prep Quality:  [unfilled] [Withdrawal Time: ___] : Withdrawal Time:  [unfilled] [Performed By: ___] : Performed by:  NICHOLAS [Left Lateral Decubitus] : The patient was positioned in the left lateral decubitus position [Abnormal Rectum] : a normal rectum [External Hemorrhoids] : no external hemorrhoids [Normal Prostate] : a normal prostate [Terminal Ileum via Ileocecal Valve] : and the terminal ileum was examined by entering the ileocecal valve [No Difficulty] : without difficulty [Retroflex View] : a retroflex view of the rectum was performed [Insufflated] : insufflated [Multiple Passes Needed] : after multiple passes [Normal] : Normal [Biopsy] : biopsy [Colitis Patchy] : colitis patchy [Polyps] : polyps [Proctitis (diffuse)] : Proctitis (diffuse) [Tolerated Well] : the patient tolerated the procedure well [Sent to Pathology] : was sent to pathology for analysis [Vital Signs Stable] : the vital signs were stable [FreeTextEntry2] : [id number] [No Complications] : There were no complications [de-identified] : Elevated CRP and abnormal fecal calprotectin [de-identified] : Mild patchy erythema biopsied to r/o colitis [de-identified] : mild patchy erythema  biopsied to r/o colitis  [de-identified] : Normal TI for 5 cm  [de-identified] : Moderate patchy erythema and telangiectasia likely secondary to radiation \par Biopsied to r/o IBD [FreeTextEntry1] : Mild to moderate patchy erythema in the rectum and rectosigmoid junction 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 Biopsied to r/o IBD \par Small polyp removed\par \par Recommendations\par F/U pathology\par Follow up in the office in 2 weeks\par  [de-identified] : 5 mm semi sessile polyp removed with biopsy forceps\par \par Moderate Patchy erythema Biopsied

## 2020-07-01 NOTE — HISTORY OF PRESENT ILLNESS
[de-identified] : Is a 39-year-old woman with a history of cervical cancer, status post chemotherapy and radiation, IVC filter, pulmonary embolus who presents with diarrhea. She's a history of IBS. His multiple bowel movements a day. She denies any rectal bleeding. She was sent for several stool studies including fecal calprotectin, CRP, which were both abnormal. She's episodes of fecal incontinence. She also has severe GERD. \par \par This is a preprocedure note.

## 2020-07-01 NOTE — HISTORY OF PRESENT ILLNESS
[de-identified] : Is a 39-year-old woman with a history of cervical cancer, status post chemotherapy and radiation, IVC filter, pulmonary embolus who presents with diarrhea. She's a history of IBS. His multiple bowel movements a day. She denies any rectal bleeding. She was sent for several stool studies including fecal calprotectin, CRP, which were both abnormal. She's episodes of fecal incontinence. She's been taking Imodium p.r.n.. During the COVID pandemic I empirically started her on Lialda. When she takes the Nikita that she stays states her diarrhea is better. She had a particularly bad weekend and needed adult diapers to go out. She had a colonoscopy in 2013 or 14 which showed "radiation changes". This was done at [facility]. \par She also has severe GERD. She been on omeprazole twice daily, although she ran out, and has been off of it for few days. She has constant reflux symptoms off PPI. She's had episodes of nausea and vomiting. She denies any dysphagia. She has a history of a small hiatal hernia.

## 2020-07-01 NOTE — HISTORY OF PRESENT ILLNESS
[de-identified] : Is a 39-year-old woman with a history of cervical cancer, status post chemotherapy and radiation, IVC filter, pulmonary embolus who presents with diarrhea. She's a history of IBS. His multiple bowel movements a day. She denies any rectal bleeding. She was sent for several stool studies including fecal calprotectin, CRP, which were both abnormal. She's episodes of fecal incontinence. She's been taking Imodium p.r.n.. During the COVID pandemic I empirically started her on Lialda. When she takes the Nikita that she stays states her diarrhea is better. She had a particularly bad weekend and needed adult diapers to go out. She had a colonoscopy in 2013 or 14 which showed "radiation changes". This was done at [facility]. \par She also has severe GERD. She been on omeprazole twice daily, although she ran out, and has been off of it for few days. She has constant reflux symptoms off PPI. She's had episodes of nausea and vomiting. She denies any dysphagia. She has a history of a small hiatal hernia.

## 2020-07-01 NOTE — PHYSICAL EXAM
[General Appearance - Alert] : alert [General Appearance - In No Acute Distress] : in no acute distress [Sclera] : the sclera and conjunctiva were normal [PERRL With Normal Accommodation] : pupils were equal in size, round, and reactive to light [Extraocular Movements] : extraocular movements were intact [Oropharynx] : the oropharynx was normal [Outer Ear] : the ears and nose were normal in appearance [Neck Appearance] : the appearance of the neck was normal [Auscultation Breath Sounds / Voice Sounds] : lungs were clear to auscultation bilaterally [Heart Sounds] : normal S1 and S2 [Heart Rate And Rhythm] : heart rate was normal and rhythm regular [Heart Sounds Pericardial Friction Rub] : no pericardial rub [Heart Sounds Gallop] : no gallops [Murmurs] : no murmurs [Bowel Sounds] : normal bowel sounds [Abdomen Soft] : soft [Abdomen Tenderness] : non-tender [Abdomen Mass (___ Cm)] : no abdominal mass palpated [] : no hepato-splenomegaly [No Spinal Tenderness] : no spinal tenderness [No CVA Tenderness] : no ~M costovertebral angle tenderness [Skin Color & Pigmentation] : normal skin color and pigmentation [Oriented To Time, Place, And Person] : oriented to person, place, and time [Affect] : the affect was normal [Impaired Insight] : insight and judgment were intact

## 2020-07-01 NOTE — ASSESSMENT
[FreeTextEntry1] : 39-year-old woman, who presents with chronic diarrhea and abnormal fecal calprotectin and elevated CRP. I will schedule her for a colonoscopy to rule out IBD. Severe GERD symptoms. Schedule her for an EGD.\par \par The pateint is optimized.

## 2020-07-06 ENCOUNTER — RESULT REVIEW (OUTPATIENT)
Age: 40
End: 2020-07-06

## 2020-07-06 ENCOUNTER — OUTPATIENT (OUTPATIENT)
Dept: OUTPATIENT SERVICES | Facility: HOSPITAL | Age: 40
LOS: 1 days | End: 2020-07-06
Payer: COMMERCIAL

## 2020-07-06 ENCOUNTER — APPOINTMENT (OUTPATIENT)
Dept: MAMMOGRAPHY | Facility: CLINIC | Age: 40
End: 2020-07-06
Payer: COMMERCIAL

## 2020-07-06 ENCOUNTER — APPOINTMENT (OUTPATIENT)
Dept: MRI IMAGING | Facility: CLINIC | Age: 40
End: 2020-07-06
Payer: COMMERCIAL

## 2020-07-06 ENCOUNTER — TRANSCRIPTION ENCOUNTER (OUTPATIENT)
Age: 40
End: 2020-07-06

## 2020-07-06 ENCOUNTER — APPOINTMENT (OUTPATIENT)
Dept: ULTRASOUND IMAGING | Facility: CLINIC | Age: 40
End: 2020-07-06
Payer: COMMERCIAL

## 2020-07-06 DIAGNOSIS — Z00.00 ENCOUNTER FOR GENERAL ADULT MEDICAL EXAMINATION WITHOUT ABNORMAL FINDINGS: ICD-10-CM

## 2020-07-06 DIAGNOSIS — Z98.89 OTHER SPECIFIED POSTPROCEDURAL STATES: Chronic | ICD-10-CM

## 2020-07-06 DIAGNOSIS — Z96.22 MYRINGOTOMY TUBE(S) STATUS: Chronic | ICD-10-CM

## 2020-07-06 DIAGNOSIS — Z86.711 PERSONAL HISTORY OF PULMONARY EMBOLISM: Chronic | ICD-10-CM

## 2020-07-06 DIAGNOSIS — R93.3 ABNORMAL FINDINGS ON DIAGNOSTIC IMAGING OF OTHER PARTS OF DIGESTIVE TRACT: Chronic | ICD-10-CM

## 2020-07-06 DIAGNOSIS — Z12.39 ENCOUNTER FOR OTHER SCREENING FOR MALIGNANT NEOPLASM OF BREAST: ICD-10-CM

## 2020-07-06 DIAGNOSIS — C53.9 MALIGNANT NEOPLASM OF CERVIX UTERI, UNSPECIFIED: Chronic | ICD-10-CM

## 2020-07-06 PROCEDURE — 77049 MRI BREAST C-+ W/CAD BI: CPT | Mod: 26

## 2020-07-06 PROCEDURE — 77066 DX MAMMO INCL CAD BI: CPT | Mod: 26

## 2020-07-06 PROCEDURE — G0279: CPT | Mod: 26

## 2020-07-06 PROCEDURE — 76641 ULTRASOUND BREAST COMPLETE: CPT | Mod: 26,50

## 2020-07-06 PROCEDURE — C8908: CPT

## 2020-07-06 PROCEDURE — G0279: CPT

## 2020-07-06 PROCEDURE — 76641 ULTRASOUND BREAST COMPLETE: CPT

## 2020-07-06 PROCEDURE — 77066 DX MAMMO INCL CAD BI: CPT

## 2020-07-06 PROCEDURE — C8937: CPT

## 2020-07-06 RX ORDER — MESALAMINE 1.2 G/1
1.2 TABLET, DELAYED RELEASE ORAL DAILY
Qty: 360 | Refills: 1 | Status: DISCONTINUED | COMMUNITY
Start: 2020-05-13 | End: 2020-07-06

## 2020-07-06 RX ORDER — MESALAMINE 1.2 G/1
1.2 TABLET, DELAYED RELEASE ORAL DAILY
Qty: 120 | Refills: 0 | Status: DISCONTINUED | COMMUNITY
Start: 2020-05-08 | End: 2020-07-06

## 2020-07-06 RX ORDER — MESALAMINE 1.2 G/1
1.2 TABLET, DELAYED RELEASE ORAL DAILY
Qty: 120 | Refills: 1 | Status: DISCONTINUED | COMMUNITY
Start: 2020-05-12 | End: 2020-07-06

## 2020-07-07 LAB — SURGICAL PATHOLOGY STUDY: SIGNIFICANT CHANGE UP

## 2020-07-14 ENCOUNTER — APPOINTMENT (OUTPATIENT)
Dept: GASTROENTEROLOGY | Facility: CLINIC | Age: 40
End: 2020-07-14
Payer: COMMERCIAL

## 2020-07-14 VITALS
HEIGHT: 64 IN | OXYGEN SATURATION: 98 % | SYSTOLIC BLOOD PRESSURE: 120 MMHG | WEIGHT: 190 LBS | DIASTOLIC BLOOD PRESSURE: 82 MMHG | TEMPERATURE: 97.6 F | RESPIRATION RATE: 15 BRPM | BODY MASS INDEX: 32.44 KG/M2

## 2020-07-14 PROCEDURE — 99213 OFFICE O/P EST LOW 20 MIN: CPT

## 2020-07-14 RX ORDER — METHYLCELLULOSE 500 MG/1
500 TABLET ORAL
Qty: 60 | Refills: 0 | Status: DISCONTINUED | COMMUNITY
Start: 2019-11-08 | End: 2020-07-14

## 2020-07-14 NOTE — PHYSICAL EXAM
[General Appearance - Alert] : alert [General Appearance - In No Acute Distress] : in no acute distress [Extraocular Movements] : extraocular movements were intact [Sclera] : the sclera and conjunctiva were normal [Oropharynx] : the oropharynx was normal [Neck Appearance] : the appearance of the neck was normal [Outer Ear] : the ears and nose were normal in appearance [Heart Rate And Rhythm] : heart rate was normal and rhythm regular [Heart Sounds] : normal S1 and S2 [Heart Sounds Gallop] : no gallops [Murmurs] : no murmurs [Abdomen Soft] : soft [Bowel Sounds] : normal bowel sounds [Heart Sounds Pericardial Friction Rub] : no pericardial rub [] : no hepato-splenomegaly [Abdomen Tenderness] : non-tender [No Spinal Tenderness] : no spinal tenderness [No CVA Tenderness] : no ~M costovertebral angle tenderness [Oriented To Time, Place, And Person] : oriented to person, place, and time [Skin Color & Pigmentation] : normal skin color and pigmentation [Impaired Insight] : insight and judgment were intact [Affect] : the affect was normal

## 2020-07-14 NOTE — ASSESSMENT
[FreeTextEntry1] : 39-year-old woman, who presents with chronic diarrhea. She likely has IBS and fecal incontinence. Cont antidiarrheals. Not a candidate for viberzi given risk of pancreatitis. Stop fiber. Follow up in 6 weeks.

## 2020-07-14 NOTE — HISTORY OF PRESENT ILLNESS
[de-identified] : Is a 39-year-old woman with a history of cervical cancer, status post chemotherapy and radiation, IVC filter, pulmonary embolus who presents with diarrhea. She's a history of IBS. His multiple bowel movements a day. She denies any rectal bleeding. She was sent for several stool studies including fecal calprotectin, CRP, which were both abnormal. She's episodes of fecal incontinence. She also has severe GERD. \par \par Follow up\par She is s/p EGD with reflux esophagitis. She had a colonoscopy which was consistent with mild radiation proctitis. She still has diarrhea.

## 2020-08-05 ENCOUNTER — TRANSCRIPTION ENCOUNTER (OUTPATIENT)
Age: 40
End: 2020-08-05

## 2020-09-09 ENCOUNTER — APPOINTMENT (OUTPATIENT)
Dept: GASTROENTEROLOGY | Facility: CLINIC | Age: 40
End: 2020-09-09

## 2020-10-13 ENCOUNTER — RX CHANGE (OUTPATIENT)
Age: 40
End: 2020-10-13

## 2020-12-14 ENCOUNTER — APPOINTMENT (OUTPATIENT)
Dept: GASTROENTEROLOGY | Facility: CLINIC | Age: 40
End: 2020-12-14
Payer: COMMERCIAL

## 2020-12-14 VITALS
HEART RATE: 85 BPM | SYSTOLIC BLOOD PRESSURE: 127 MMHG | BODY MASS INDEX: 33.12 KG/M2 | TEMPERATURE: 97.5 F | WEIGHT: 194 LBS | HEIGHT: 64 IN | DIASTOLIC BLOOD PRESSURE: 80 MMHG

## 2020-12-14 PROCEDURE — 99214 OFFICE O/P EST MOD 30 MIN: CPT

## 2020-12-14 PROCEDURE — 99072 ADDL SUPL MATRL&STAF TM PHE: CPT

## 2020-12-14 PROCEDURE — 82272 OCCULT BLD FECES 1-3 TESTS: CPT

## 2020-12-14 NOTE — PHYSICAL EXAM
[General Appearance - Alert] : alert [General Appearance - In No Acute Distress] : in no acute distress [General Appearance - Well Nourished] : well nourished [Sclera] : the sclera and conjunctiva were normal [Outer Ear] : the ears and nose were normal in appearance [Neck Appearance] : the appearance of the neck was normal [] : no respiratory distress [Respiration, Rhythm And Depth] : normal respiratory rhythm and effort [Exaggerated Use Of Accessory Muscles For Inspiration] : no accessory muscle use [Auscultation Breath Sounds / Voice Sounds] : lungs were clear to auscultation bilaterally [Apical Impulse] : the apical impulse was normal [Heart Rate And Rhythm] : heart rate was normal and rhythm regular [Heart Sounds] : normal S1 and S2 [Bowel Sounds] : normal bowel sounds [Abdomen Soft] : soft [Abdomen Tenderness] : non-tender [Normal Sphincter Tone] : normal sphincter tone [No Rectal Mass] : no rectal mass [External Hemorrhoid] : external hemorrhoids [Occult Blood Positive] : stool positive for occult blood [Femoral Lymph Nodes Enlarged Bilaterally] : femoral [Skin Color & Pigmentation] : normal skin color and pigmentation [Oriented To Time, Place, And Person] : oriented to person, place, and time

## 2020-12-14 NOTE — HISTORY OF PRESENT ILLNESS
[de-identified] : Patient seen by Dr Montes . Hx of cervical cancer. S/P chemo and radiation, DVT/PE.+ chronic diarrhea. Intermittnet rectal bleeding, fecal incontinence . In july,  colon showed radiation colitis. Ascending  colon showed erythema, descending colon showed patchy erythema, sigmoid small 5 mm polyp, rectum -proctitis. Path negative. Patient states for at least 2 weeks, pt has rectal bleeding and hematuria.  Has 7-10 loose, bloody stools a day. no weight loss.+ night time diarrhea. . no weight loss.   Took multiple photos of her bloody BM's on multiple days.  Has home monitor ofor INR- found to have elevated INR on 12/3- INR 4.9. coumadin held for few days. Patient has tried mesalamin ( liadla)- now off.  now on bentyl tid, and immodium bid. \par       Patient with GERD. = + severe heartbrun, regurgitation. Occurs qd. Patient was taking prilosec 80 mg q am, tagamet . Symptoms were controlled.   Ran out of meds and heartburn was severe.  Worse with laying down.No better with mylanta, peptobismol, TUMS .\par \par     .\par \par

## 2020-12-14 NOTE — ASSESSMENT
[FreeTextEntry1] : A/P\par \par gerd\par Today's instructions for acid reflux include avoid provocative foods. For example citrus alcohol coffee chocolate mints. Smaller meals, no eating 3 hours prior to bedtime and elevate head of the bed prior to sleep.\par prilosec 40 mg bid\par pepcid 40 mg qhs\par carafate 1 g qid\par \par bloody diarrhea- chronic\par stool studies\par cbc,inr\par consider questran qid - s/p cholecystectomy\par consider mesalaline suppositories\par consider lomotil\par  for now continue immodium and bentyl\par \par F/U in 6 weeks

## 2020-12-16 ENCOUNTER — NON-APPOINTMENT (OUTPATIENT)
Age: 40
End: 2020-12-16

## 2020-12-16 ENCOUNTER — LABORATORY RESULT (OUTPATIENT)
Age: 40
End: 2020-12-16

## 2020-12-17 LAB
ALBUMIN SERPL ELPH-MCNC: 4.2 G/DL
ALP BLD-CCNC: 93 U/L
ALT SERPL-CCNC: 24 U/L
ANION GAP SERPL CALC-SCNC: 12 MMOL/L
AST SERPL-CCNC: 19 U/L
BILIRUB SERPL-MCNC: 0.2 MG/DL
BUN SERPL-MCNC: 15 MG/DL
CALCIUM SERPL-MCNC: 9.3 MG/DL
CHLORIDE SERPL-SCNC: 103 MMOL/L
CO2 SERPL-SCNC: 29 MMOL/L
CREAT SERPL-MCNC: 1.16 MG/DL
GI PCR PANEL, STOOL: NORMAL
GLUCOSE SERPL-MCNC: 105 MG/DL
INR PPP: 6.24 RATIO
POTASSIUM SERPL-SCNC: 4.7 MMOL/L
PROT SERPL-MCNC: 7 G/DL
PT BLD: 67.5 SEC
SODIUM SERPL-SCNC: 144 MMOL/L

## 2020-12-21 LAB
C DIFF TOXIN B QL PCR REFLEX: NORMAL
GDH ANTIGEN: NOT DETECTED
TOXIN A AND B: NOT DETECTED

## 2020-12-23 LAB — CELIACPAN: NORMAL

## 2020-12-27 LAB
CELIAC DISEASE INTERPRETATION: NORMAL
CELIAC GENE PAIRS PRESENT: YES
DQ ALPHA 1: NORMAL
DQ BETA 1: NORMAL
IMMUNOGLOBULIN A (IGA): 320 MG/DL

## 2021-01-15 ENCOUNTER — OUTPATIENT (OUTPATIENT)
Dept: OUTPATIENT SERVICES | Facility: HOSPITAL | Age: 41
LOS: 1 days | End: 2021-01-15
Payer: COMMERCIAL

## 2021-01-15 ENCOUNTER — APPOINTMENT (OUTPATIENT)
Dept: ULTRASOUND IMAGING | Facility: CLINIC | Age: 41
End: 2021-01-15
Payer: COMMERCIAL

## 2021-01-15 DIAGNOSIS — Z96.22 MYRINGOTOMY TUBE(S) STATUS: Chronic | ICD-10-CM

## 2021-01-15 DIAGNOSIS — C53.9 MALIGNANT NEOPLASM OF CERVIX UTERI, UNSPECIFIED: Chronic | ICD-10-CM

## 2021-01-15 DIAGNOSIS — Z98.89 OTHER SPECIFIED POSTPROCEDURAL STATES: Chronic | ICD-10-CM

## 2021-01-15 DIAGNOSIS — Z86.711 PERSONAL HISTORY OF PULMONARY EMBOLISM: Chronic | ICD-10-CM

## 2021-01-15 DIAGNOSIS — R93.3 ABNORMAL FINDINGS ON DIAGNOSTIC IMAGING OF OTHER PARTS OF DIGESTIVE TRACT: Chronic | ICD-10-CM

## 2021-01-15 DIAGNOSIS — N17.9 ACUTE KIDNEY FAILURE, UNSPECIFIED: ICD-10-CM

## 2021-01-15 DIAGNOSIS — Z00.8 ENCOUNTER FOR OTHER GENERAL EXAMINATION: ICD-10-CM

## 2021-01-15 PROCEDURE — 76775 US EXAM ABDO BACK WALL LIM: CPT

## 2021-01-15 PROCEDURE — 76775 US EXAM ABDO BACK WALL LIM: CPT | Mod: 26

## 2021-01-26 ENCOUNTER — APPOINTMENT (OUTPATIENT)
Dept: ULTRASOUND IMAGING | Facility: CLINIC | Age: 41
End: 2021-01-26
Payer: COMMERCIAL

## 2021-01-26 ENCOUNTER — OUTPATIENT (OUTPATIENT)
Dept: OUTPATIENT SERVICES | Facility: HOSPITAL | Age: 41
LOS: 1 days | End: 2021-01-26
Payer: COMMERCIAL

## 2021-01-26 DIAGNOSIS — Z98.89 OTHER SPECIFIED POSTPROCEDURAL STATES: Chronic | ICD-10-CM

## 2021-01-26 DIAGNOSIS — C53.9 MALIGNANT NEOPLASM OF CERVIX UTERI, UNSPECIFIED: Chronic | ICD-10-CM

## 2021-01-26 DIAGNOSIS — R93.3 ABNORMAL FINDINGS ON DIAGNOSTIC IMAGING OF OTHER PARTS OF DIGESTIVE TRACT: Chronic | ICD-10-CM

## 2021-01-26 DIAGNOSIS — Z86.711 PERSONAL HISTORY OF PULMONARY EMBOLISM: Chronic | ICD-10-CM

## 2021-01-26 DIAGNOSIS — Z96.22 MYRINGOTOMY TUBE(S) STATUS: Chronic | ICD-10-CM

## 2021-01-26 DIAGNOSIS — N18.2 CHRONIC KIDNEY DISEASE, STAGE 2 (MILD): ICD-10-CM

## 2021-01-26 DIAGNOSIS — N28.89 OTHER SPECIFIED DISORDERS OF KIDNEY AND URETER: ICD-10-CM

## 2021-01-26 DIAGNOSIS — Z00.8 ENCOUNTER FOR OTHER GENERAL EXAMINATION: ICD-10-CM

## 2021-01-26 PROCEDURE — 93975 VASCULAR STUDY: CPT | Mod: 26

## 2021-01-26 PROCEDURE — 93975 VASCULAR STUDY: CPT

## 2021-03-13 ENCOUNTER — RESULT REVIEW (OUTPATIENT)
Age: 41
End: 2021-03-13

## 2021-03-28 ENCOUNTER — APPOINTMENT (OUTPATIENT)
Dept: DISASTER EMERGENCY | Facility: OTHER | Age: 41
End: 2021-03-28
Payer: COMMERCIAL

## 2021-03-28 PROCEDURE — 0011A: CPT

## 2021-03-29 ENCOUNTER — TRANSCRIPTION ENCOUNTER (OUTPATIENT)
Age: 41
End: 2021-03-29

## 2021-04-06 ENCOUNTER — RX RENEWAL (OUTPATIENT)
Age: 41
End: 2021-04-06

## 2021-04-20 ENCOUNTER — TRANSCRIPTION ENCOUNTER (OUTPATIENT)
Age: 41
End: 2021-04-20

## 2021-04-21 ENCOUNTER — APPOINTMENT (OUTPATIENT)
Dept: CT IMAGING | Facility: CLINIC | Age: 41
End: 2021-04-21
Payer: COMMERCIAL

## 2021-04-21 ENCOUNTER — OUTPATIENT (OUTPATIENT)
Dept: OUTPATIENT SERVICES | Facility: HOSPITAL | Age: 41
LOS: 1 days | End: 2021-04-21
Payer: COMMERCIAL

## 2021-04-21 DIAGNOSIS — Z98.89 OTHER SPECIFIED POSTPROCEDURAL STATES: Chronic | ICD-10-CM

## 2021-04-21 DIAGNOSIS — C53.9 MALIGNANT NEOPLASM OF CERVIX UTERI, UNSPECIFIED: Chronic | ICD-10-CM

## 2021-04-21 DIAGNOSIS — Z00.8 ENCOUNTER FOR OTHER GENERAL EXAMINATION: ICD-10-CM

## 2021-04-21 DIAGNOSIS — Z96.22 MYRINGOTOMY TUBE(S) STATUS: Chronic | ICD-10-CM

## 2021-04-21 DIAGNOSIS — R93.3 ABNORMAL FINDINGS ON DIAGNOSTIC IMAGING OF OTHER PARTS OF DIGESTIVE TRACT: Chronic | ICD-10-CM

## 2021-04-21 DIAGNOSIS — H92.02 OTALGIA, LEFT EAR: ICD-10-CM

## 2021-04-21 DIAGNOSIS — Z86.711 PERSONAL HISTORY OF PULMONARY EMBOLISM: Chronic | ICD-10-CM

## 2021-04-21 PROCEDURE — 70480 CT ORBIT/EAR/FOSSA W/O DYE: CPT | Mod: 26

## 2021-04-21 PROCEDURE — 70480 CT ORBIT/EAR/FOSSA W/O DYE: CPT

## 2021-04-22 ENCOUNTER — INPATIENT (INPATIENT)
Facility: HOSPITAL | Age: 41
LOS: 0 days | Discharge: HOME | DRG: 153 | End: 2021-04-23
Attending: INTERNAL MEDICINE | Admitting: HOSPITALIST
Payer: COMMERCIAL

## 2021-04-22 VITALS
OXYGEN SATURATION: 97 % | HEART RATE: 110 BPM | RESPIRATION RATE: 20 BRPM | DIASTOLIC BLOOD PRESSURE: 93 MMHG | SYSTOLIC BLOOD PRESSURE: 140 MMHG | WEIGHT: 139.99 LBS | TEMPERATURE: 100 F | HEIGHT: 64 IN

## 2021-04-22 DIAGNOSIS — Z98.89 OTHER SPECIFIED POSTPROCEDURAL STATES: Chronic | ICD-10-CM

## 2021-04-22 DIAGNOSIS — Z86.711 PERSONAL HISTORY OF PULMONARY EMBOLISM: Chronic | ICD-10-CM

## 2021-04-22 DIAGNOSIS — R93.3 ABNORMAL FINDINGS ON DIAGNOSTIC IMAGING OF OTHER PARTS OF DIGESTIVE TRACT: Chronic | ICD-10-CM

## 2021-04-22 DIAGNOSIS — H70.90 UNSPECIFIED MASTOIDITIS, UNSPECIFIED EAR: ICD-10-CM

## 2021-04-22 DIAGNOSIS — Z96.22 MYRINGOTOMY TUBE(S) STATUS: Chronic | ICD-10-CM

## 2021-04-22 DIAGNOSIS — C53.9 MALIGNANT NEOPLASM OF CERVIX UTERI, UNSPECIFIED: Chronic | ICD-10-CM

## 2021-04-22 LAB
ALBUMIN SERPL ELPH-MCNC: 4.2 G/DL — SIGNIFICANT CHANGE UP (ref 3.3–5.2)
ALP SERPL-CCNC: 100 U/L — SIGNIFICANT CHANGE UP (ref 40–120)
ALT FLD-CCNC: 16 U/L — SIGNIFICANT CHANGE UP
ANION GAP SERPL CALC-SCNC: 14 MMOL/L — SIGNIFICANT CHANGE UP (ref 5–17)
APTT BLD: 57.6 SEC — HIGH (ref 27.5–35.5)
AST SERPL-CCNC: 14 U/L — SIGNIFICANT CHANGE UP
BASOPHILS # BLD AUTO: 0.06 K/UL — SIGNIFICANT CHANGE UP (ref 0–0.2)
BASOPHILS NFR BLD AUTO: 0.8 % — SIGNIFICANT CHANGE UP (ref 0–2)
BILIRUB SERPL-MCNC: <0.2 MG/DL — LOW (ref 0.4–2)
BUN SERPL-MCNC: 15 MG/DL — SIGNIFICANT CHANGE UP (ref 8–20)
CALCIUM SERPL-MCNC: 9.4 MG/DL — SIGNIFICANT CHANGE UP (ref 8.6–10.2)
CHLORIDE SERPL-SCNC: 104 MMOL/L — SIGNIFICANT CHANGE UP (ref 98–107)
CO2 SERPL-SCNC: 25 MMOL/L — SIGNIFICANT CHANGE UP (ref 22–29)
CREAT SERPL-MCNC: 0.73 MG/DL — SIGNIFICANT CHANGE UP (ref 0.5–1.3)
EOSINOPHIL # BLD AUTO: 0.28 K/UL — SIGNIFICANT CHANGE UP (ref 0–0.5)
EOSINOPHIL NFR BLD AUTO: 4 % — SIGNIFICANT CHANGE UP (ref 0–6)
GLUCOSE SERPL-MCNC: 108 MG/DL — HIGH (ref 70–99)
HCG SERPL-ACNC: <4 MIU/ML — SIGNIFICANT CHANGE UP
HCT VFR BLD CALC: 41 % — SIGNIFICANT CHANGE UP (ref 34.5–45)
HGB BLD-MCNC: 13.3 G/DL — SIGNIFICANT CHANGE UP (ref 11.5–15.5)
IMM GRANULOCYTES NFR BLD AUTO: 0.1 % — SIGNIFICANT CHANGE UP (ref 0–1.5)
INR BLD: 2.56 RATIO — HIGH (ref 0.88–1.16)
LACTATE BLDV-MCNC: 1.8 MMOL/L — SIGNIFICANT CHANGE UP (ref 0.5–2)
LYMPHOCYTES # BLD AUTO: 2.56 K/UL — SIGNIFICANT CHANGE UP (ref 1–3.3)
LYMPHOCYTES # BLD AUTO: 36.3 % — SIGNIFICANT CHANGE UP (ref 13–44)
MCHC RBC-ENTMCNC: 26.8 PG — LOW (ref 27–34)
MCHC RBC-ENTMCNC: 32.4 GM/DL — SIGNIFICANT CHANGE UP (ref 32–36)
MCV RBC AUTO: 82.7 FL — SIGNIFICANT CHANGE UP (ref 80–100)
MONOCYTES # BLD AUTO: 0.47 K/UL — SIGNIFICANT CHANGE UP (ref 0–0.9)
MONOCYTES NFR BLD AUTO: 6.7 % — SIGNIFICANT CHANGE UP (ref 2–14)
NEUTROPHILS # BLD AUTO: 3.68 K/UL — SIGNIFICANT CHANGE UP (ref 1.8–7.4)
NEUTROPHILS NFR BLD AUTO: 52.1 % — SIGNIFICANT CHANGE UP (ref 43–77)
PLATELET # BLD AUTO: 337 K/UL — SIGNIFICANT CHANGE UP (ref 150–400)
POTASSIUM SERPL-MCNC: 4 MMOL/L — SIGNIFICANT CHANGE UP (ref 3.5–5.3)
POTASSIUM SERPL-SCNC: 4 MMOL/L — SIGNIFICANT CHANGE UP (ref 3.5–5.3)
PROT SERPL-MCNC: 7.8 G/DL — SIGNIFICANT CHANGE UP (ref 6.6–8.7)
PROTHROM AB SERPL-ACNC: 28.4 SEC — HIGH (ref 10.6–13.6)
RBC # BLD: 4.96 M/UL — SIGNIFICANT CHANGE UP (ref 3.8–5.2)
RBC # FLD: 13.7 % — SIGNIFICANT CHANGE UP (ref 10.3–14.5)
SARS-COV-2 RNA SPEC QL NAA+PROBE: SIGNIFICANT CHANGE UP
SODIUM SERPL-SCNC: 143 MMOL/L — SIGNIFICANT CHANGE UP (ref 135–145)
WBC # BLD: 7.06 K/UL — SIGNIFICANT CHANGE UP (ref 3.8–10.5)
WBC # FLD AUTO: 7.06 K/UL — SIGNIFICANT CHANGE UP (ref 3.8–10.5)

## 2021-04-22 PROCEDURE — 93010 ELECTROCARDIOGRAM REPORT: CPT

## 2021-04-22 PROCEDURE — 71045 X-RAY EXAM CHEST 1 VIEW: CPT | Mod: 26

## 2021-04-22 PROCEDURE — 99223 1ST HOSP IP/OBS HIGH 75: CPT

## 2021-04-22 PROCEDURE — G1004: CPT

## 2021-04-22 PROCEDURE — 99291 CRITICAL CARE FIRST HOUR: CPT

## 2021-04-22 PROCEDURE — 70487 CT MAXILLOFACIAL W/DYE: CPT | Mod: 26,MG

## 2021-04-22 RX ORDER — ONDANSETRON 8 MG/1
4 TABLET, FILM COATED ORAL EVERY 6 HOURS
Refills: 0 | Status: DISCONTINUED | OUTPATIENT
Start: 2021-04-22 | End: 2021-04-23

## 2021-04-22 RX ORDER — ACETAMINOPHEN 500 MG
650 TABLET ORAL EVERY 6 HOURS
Refills: 0 | Status: DISCONTINUED | OUTPATIENT
Start: 2021-04-22 | End: 2021-04-23

## 2021-04-22 RX ORDER — SODIUM CHLORIDE 9 MG/ML
1950 INJECTION, SOLUTION INTRAVENOUS ONCE
Refills: 0 | Status: COMPLETED | OUTPATIENT
Start: 2021-04-22 | End: 2021-04-22

## 2021-04-22 RX ORDER — WARFARIN SODIUM 2.5 MG/1
2 TABLET ORAL ONCE
Refills: 0 | Status: COMPLETED | OUTPATIENT
Start: 2021-04-22 | End: 2021-04-22

## 2021-04-22 RX ORDER — ACETAMINOPHEN 500 MG
650 TABLET ORAL ONCE
Refills: 0 | Status: COMPLETED | OUTPATIENT
Start: 2021-04-22 | End: 2021-04-22

## 2021-04-22 RX ORDER — MIDAZOLAM HYDROCHLORIDE 1 MG/ML
2 INJECTION, SOLUTION INTRAMUSCULAR; INTRAVENOUS ONCE
Refills: 0 | Status: DISCONTINUED | OUTPATIENT
Start: 2021-04-22 | End: 2021-04-22

## 2021-04-22 RX ORDER — METHYLCELLULOSE 500 MG
2 TABLET ORAL
Qty: 0 | Refills: 0 | DISCHARGE

## 2021-04-22 RX ORDER — VANCOMYCIN HCL 1 G
1000 VIAL (EA) INTRAVENOUS ONCE
Refills: 0 | Status: COMPLETED | OUTPATIENT
Start: 2021-04-22 | End: 2021-04-22

## 2021-04-22 RX ORDER — GABAPENTIN 400 MG/1
1 CAPSULE ORAL
Qty: 0 | Refills: 0 | DISCHARGE

## 2021-04-22 RX ORDER — SERTRALINE 25 MG/1
1 TABLET, FILM COATED ORAL
Qty: 0 | Refills: 0 | DISCHARGE

## 2021-04-22 RX ORDER — SUCRALFATE 1 G
1 TABLET ORAL
Qty: 0 | Refills: 0 | DISCHARGE

## 2021-04-22 RX ORDER — VANCOMYCIN HCL 1 G
1000 VIAL (EA) INTRAVENOUS EVERY 12 HOURS
Refills: 0 | Status: DISCONTINUED | OUTPATIENT
Start: 2021-04-22 | End: 2021-04-22

## 2021-04-22 RX ORDER — PIPERACILLIN AND TAZOBACTAM 4; .5 G/20ML; G/20ML
3.38 INJECTION, POWDER, LYOPHILIZED, FOR SOLUTION INTRAVENOUS EVERY 8 HOURS
Refills: 0 | Status: DISCONTINUED | OUTPATIENT
Start: 2021-04-23 | End: 2021-04-23

## 2021-04-22 RX ORDER — SERTRALINE 25 MG/1
50 TABLET, FILM COATED ORAL DAILY
Refills: 0 | Status: DISCONTINUED | OUTPATIENT
Start: 2021-04-22 | End: 2021-04-23

## 2021-04-22 RX ORDER — GABAPENTIN 400 MG/1
100 CAPSULE ORAL THREE TIMES A DAY
Refills: 0 | Status: DISCONTINUED | OUTPATIENT
Start: 2021-04-22 | End: 2021-04-23

## 2021-04-22 RX ORDER — MORPHINE SULFATE 50 MG/1
4 CAPSULE, EXTENDED RELEASE ORAL ONCE
Refills: 0 | Status: DISCONTINUED | OUTPATIENT
Start: 2021-04-22 | End: 2021-04-22

## 2021-04-22 RX ORDER — FAMOTIDINE 10 MG/ML
1 INJECTION INTRAVENOUS
Qty: 0 | Refills: 0 | DISCHARGE

## 2021-04-22 RX ORDER — VANCOMYCIN HCL 1 G
1000 VIAL (EA) INTRAVENOUS EVERY 8 HOURS
Refills: 0 | Status: DISCONTINUED | OUTPATIENT
Start: 2021-04-23 | End: 2021-04-23

## 2021-04-22 RX ORDER — PIPERACILLIN AND TAZOBACTAM 4; .5 G/20ML; G/20ML
3.38 INJECTION, POWDER, LYOPHILIZED, FOR SOLUTION INTRAVENOUS ONCE
Refills: 0 | Status: COMPLETED | OUTPATIENT
Start: 2021-04-22 | End: 2021-04-22

## 2021-04-22 RX ORDER — ONDANSETRON 8 MG/1
4 TABLET, FILM COATED ORAL ONCE
Refills: 0 | Status: COMPLETED | OUTPATIENT
Start: 2021-04-22 | End: 2021-04-22

## 2021-04-22 RX ORDER — PANTOPRAZOLE SODIUM 20 MG/1
40 TABLET, DELAYED RELEASE ORAL
Refills: 0 | Status: DISCONTINUED | OUTPATIENT
Start: 2021-04-22 | End: 2021-04-23

## 2021-04-22 RX ORDER — MECLIZINE HCL 12.5 MG
1 TABLET ORAL
Qty: 0 | Refills: 0 | DISCHARGE

## 2021-04-22 RX ORDER — SODIUM CHLORIDE 9 MG/ML
1000 INJECTION INTRAMUSCULAR; INTRAVENOUS; SUBCUTANEOUS
Refills: 0 | Status: COMPLETED | OUTPATIENT
Start: 2021-04-22 | End: 2021-04-22

## 2021-04-22 RX ORDER — LOPERAMIDE HCL 2 MG
1 TABLET ORAL
Qty: 0 | Refills: 0 | DISCHARGE

## 2021-04-22 RX ORDER — LACTOBACILLUS ACIDOPHILUS 100MM CELL
1 CAPSULE ORAL
Refills: 0 | Status: DISCONTINUED | OUTPATIENT
Start: 2021-04-22 | End: 2021-04-23

## 2021-04-22 RX ORDER — ALPRAZOLAM 0.25 MG
1 TABLET ORAL
Qty: 0 | Refills: 0 | DISCHARGE

## 2021-04-22 RX ORDER — OMEPRAZOLE 10 MG/1
1 CAPSULE, DELAYED RELEASE ORAL
Qty: 0 | Refills: 0 | DISCHARGE

## 2021-04-22 RX ADMIN — Medication 650 MILLIGRAM(S): at 16:04

## 2021-04-22 RX ADMIN — Medication 2 MILLIGRAM(S): at 19:53

## 2021-04-22 RX ADMIN — SODIUM CHLORIDE 125 MILLILITER(S): 9 INJECTION INTRAMUSCULAR; INTRAVENOUS; SUBCUTANEOUS at 23:48

## 2021-04-22 RX ADMIN — MORPHINE SULFATE 4 MILLIGRAM(S): 50 CAPSULE, EXTENDED RELEASE ORAL at 16:20

## 2021-04-22 RX ADMIN — Medication 250 MILLIGRAM(S): at 17:49

## 2021-04-22 RX ADMIN — PIPERACILLIN AND TAZOBACTAM 200 GRAM(S): 4; .5 INJECTION, POWDER, LYOPHILIZED, FOR SOLUTION INTRAVENOUS at 16:20

## 2021-04-22 RX ADMIN — ONDANSETRON 4 MILLIGRAM(S): 8 TABLET, FILM COATED ORAL at 16:20

## 2021-04-22 RX ADMIN — MIDAZOLAM HYDROCHLORIDE 2 MILLIGRAM(S): 1 INJECTION, SOLUTION INTRAMUSCULAR; INTRAVENOUS at 19:53

## 2021-04-22 RX ADMIN — SODIUM CHLORIDE 1950 MILLILITER(S): 9 INJECTION, SOLUTION INTRAVENOUS at 16:04

## 2021-04-22 RX ADMIN — WARFARIN SODIUM 2 MILLIGRAM(S): 2.5 TABLET ORAL at 23:37

## 2021-04-22 NOTE — ED PROVIDER NOTE - PHYSICAL EXAMINATION
Const: Awake, alert and oriented. In no acute distress. Well appearing.  HEENT: NC/AT. Moist mucous membranes. Left ear with numbness of the pinna, granulation tissue in the auditory canal obscuring the TM, + TTP of the left mastoid, left temporal bone, left frontal sinus and left zygoma. No elevation of the tongue. No hot-potato voice, no drooling.  Eyes: No scleral icterus. EOMI.  Neck:. Soft and supple. + TTP on the left anterior and lateral aspect of the neck. No crepitus to palpation.  Cardiac: Regular rate and regular rhythm. +S1/S2. No murmurs. Peripheral pulses 2+ and symmetric. No LE edema.  Resp: Speaking in full sentences. No evidence of respiratory distress. No wheezes, rales or rhonchi.  Abd: Soft, non-tender, non-distended. Normal bowel sounds in all 4 quadrants. No guarding or rebound.  Back: Spine midline and non-tender. No CVAT.  Skin: No rashes, abrasions or lacerations.  Lymph: + left anterior and posterior cervical lymphadenopathy.  Neuro: Awake, alert & oriented x 3. Moves all extremities symmetrically.

## 2021-04-22 NOTE — ED ADULT TRIAGE NOTE - CHIEF COMPLAINT QUOTE
patient sent by  after receiving CT results that showed osteomyelitis of the left mastoid/temporal region. currently c/o left sided facial pain. hx of cervical CA. patient brought directly to critical care area for abnormal vital signs.

## 2021-04-22 NOTE — H&P ADULT - NSICDXPASTMEDICALHX_GEN_ALL_CORE_FT
PAST MEDICAL HISTORY:  Anxiety     Anxiety     Astigmatism of both eyes     Cervical cancer     Cervical cancer     Claustrophobia     Claustrophobia     Depressed     Depressed     DVT (deep venous thrombosis), left     HPV (human papilloma virus) infection     Lung nodules     Migraine     Migraine     Pulmonary emboli     S/P appendectomy     Vertigo     Vertigo

## 2021-04-22 NOTE — H&P ADULT - NSICDXPASTSURGICALHX_GEN_ALL_CORE_FT
PAST SURGICAL HISTORY:  Abnormal colonoscopy with biop sy2013    Cervical cancer Lymph node dissection in stomach    H/O lymph node excision     History of embolectomy pulmonary    History of open heart surgery     History of pulmonary embolus (PE) Pulmonary Embolectomy and Thrombectomy    Port-a-cath in place Port removed oct 31 2013    S/P appendectomy     S/P appendectomy     S/P      S/P  section x2    S/P IVC filter DVT    S/P IVC filter     S/P LEEP     S/P myringotomy with insertion of tube tubes in ears not presently in place    S/P myringotomy with insertion of tube

## 2021-04-22 NOTE — H&P ADULT - NSHPPHYSICALEXAM_GEN_ALL_CORE
General: Well developed female somewhat tired looking , lying in bed not in distress.  HEENT: AT, NC. PERRL. intact EOMI. oral mucosa somewhat dry. + pain around front and back of the left ear, + pain over mastoid bone to palpation, TM does not appear to be bulging, no ear canal erythema or fluid in the ear canal noted. no sig. findings over rt ear.   Neck: supple. no pain over extension / flexion of neck. no JVD.   Chest: CTA bilaterally  Heart: S1,S2. RRR. no heart murmur. no edema.   Abdomen: soft. non-tender. non-distended. + BS.  Ext: no calf tenderness. distal pulses 2 +.  Neuro: AAO x3. no focal weakness. no speech disorder. Cns ii to xii intact.  Skin: warm and dy. no diaphoresis, no cyanosis.   Psych : somewhat tired looking but co-operative, no si/hi.

## 2021-04-22 NOTE — ED PROVIDER NOTE - OBJECTIVE STATEMENT
39 y/o F with PMH cervical CA, DVT, PE on Coumadin, anxiety presents complaining of left ear and head pain x 1 week, had outpatient CT without contrast which found mastoiditis and temporal osteomyelitis. She denies fevers,  but notes chills and sweats, trismus, nausea, vomiting, neck pain and left sided head pain. She started by putting drops in her left ear, which usually helps this kind of pain. Her pain worsened and her PMD ordered CT scan, which she had results of today. She has not taken anything for pain as she has no oxycodone or xanax and does not take NSAIDs as she is on Coumadin.

## 2021-04-22 NOTE — ED PROVIDER NOTE - CLINICAL SUMMARY MEDICAL DECISION MAKING FREE TEXT BOX
39 y/o F with left mastoiditis and temporal osteomyelitis on outpatient CT presents, low grade fever, tachycardic. Vanco/Zosyn for empiric ABx, 30 cc/kg fluid bolus, tylenol for fever, morphine for pain, zofran for nausea. Labs, blood cultures, CT with IV contrast, ENT consult.

## 2021-04-22 NOTE — ED PROVIDER NOTE - NS ED ROS FT
Const: + chills. Denies fever  HEENT: + left ear pain, + trismus, + left sinus pain. Denies blurry vision, sore throat  Neck: + left sided neck pain.   Resp: Denies coughing, SOB  Cardiovascular: Denies CP, palpitations, LE edema  GI: + nausea, + vomiting. Denies abdominal pain, diarrhea, constipation, blood in stool  : Denies urinary frequency/urgency/dysuria, hematuria  MSK: Denies back pain  Neuro: Denies HA, dizziness, numbness, weakness  Skin: Denies rashes.

## 2021-04-22 NOTE — PHARMACOTHERAPY INTERVENTION NOTE - COMMENTS
Beta Lactam Allergy Assessment    Current Allergies As Documented in [place]   Augmentin (Vomiting)  doxycycline (Vomiting)    Patient Interview:   (Reviewed with [X] patient [  ] other _____________)    1.  How long ago was your reaction to penicillin/amoxicillin?  Was it greater than 10 years ago?  > 10 years ago. Patient endorses childhood allergy.    2.  Did the reaction happen immediately (within 1 hour) after receiving the medication?  Does not recall.    3.  What was the reaction to the penicillin/amoxicillin?  Rash, vomiting, diarrhea. Did not have any SOB, swelling of the face or throat, or itching.     4.  Did you have difficulty breathing due to the penicillin/amoxicillin?  No.    5.  Were there any skin blisters or mouth sores that prevented you from eating as a result from the penicillin/amoxicillin?  No.    6.  Did you have to be admitted to the hospital or require treatment as a result of the penicillin/amoxicillin reaction?  No. Discotinuation of abx resolved reaction.    7.  What other beta-lactams have you tolerated in the past? (i.e. cephalexin, cefuroxime, ceftriaxone, cefdinir, cefpodoxime, cefepime, Augmentin, amoxicillin, etc.)  Does not recall.    Chart review:  [X]  No documentation of other beta-lactam administration  [   ]  Patient previously tolerated the following antibiotics (include month/yr):
Spoke to patient to obtain medication list.

## 2021-04-22 NOTE — H&P ADULT - ASSESSMENT
41 y/o female with h/o cervical cancer got radiation and chemo in the past, h/o dvt , PE , anxiety / depression came to the ER as her out pt. ct scan showed mastoiditis. ? no report shown to me , no report scanned in our system. Pt. started left ear pain/ pressure, itching in the ER about 6 days ago, pt. was using antibiotic ear drops which she had it from before. pt. thought she was having ear infection. As per pt. he had lwft ear infection 3-4 months ago. reports no fever, some HA, no neck stiffness, few episodes of vomiting, no dizziness. no rt. ear symptoms. no cp. no sob. no abd. pain. Pt. went to an urgent care center and got Rx for ct scan of her sinuses as per pt. and today she was asked to come to the ER for iv antibiotics.     - Acute otomastoiditis. pt. will be on vanco, zosyn and cipro otic drops, follow cultures. ER has called ENT on call. ID consult is requested.   Ct maxillo facial with iv contrast : Acute left otomastoiditis with mastoid coalescence.Acute bilateral sphenoid sinusitis.Clinical correlation is advised. Dedicated contrast-enhanced MRI brain IAC protocol with propeller DWI may be obtained for further assessment of these finding. spoke to radiologist at [facility] , pt. can have mri man with cointrast in am as findings do not warrant emergent mri brain, pt. also got iv contrast earlier, MRi man with iv contrast ordered to be followed and further plan as per findings.     - H/o depression, continue zoloft.     - H/o PE / dvt , continue coumadin.

## 2021-04-22 NOTE — H&P ADULT - NSICDXFAMILYHX_GEN_ALL_CORE_FT
FAMILY HISTORY:  Mother  Still living? Unknown  FH: hypothyroidism, Age at diagnosis: Age Unknown

## 2021-04-22 NOTE — H&P ADULT - HISTORY OF PRESENT ILLNESS
41 y/o female  39 y/o female with h/o cervical cancer got radiation and chemo in the past, h/o dvt , PE , anxiety / depression came to the ER as her out pt. ct scan showed mastoiditis. ? no report shown to me , no report scanned in our system. Pt. started left ear pain/ pressure, itching in the ER about 6 days ago, pt. was using antibiotic ear drops which she had it from before. pt. thought she was having ear infection. As per pt. he had lwft ear infection 3-4 months ago. reports no fever, some HA, no neck stiffness, few episodes of vomiting, no dizziness. no rt. ear symptoms. no cp. no sob. no abd. pain. Pt. went to an urgent care center and got Rx for ct scan of her sinuses as per pt. and today she was asked to come to the ER for iv antibiotics.

## 2021-04-23 ENCOUNTER — TRANSCRIPTION ENCOUNTER (OUTPATIENT)
Age: 41
End: 2021-04-23

## 2021-04-23 VITALS
OXYGEN SATURATION: 98 % | SYSTOLIC BLOOD PRESSURE: 135 MMHG | DIASTOLIC BLOOD PRESSURE: 83 MMHG | RESPIRATION RATE: 18 BRPM | HEART RATE: 94 BPM | TEMPERATURE: 99 F

## 2021-04-23 LAB
ANION GAP SERPL CALC-SCNC: 10 MMOL/L — SIGNIFICANT CHANGE UP (ref 5–17)
APPEARANCE UR: CLEAR — SIGNIFICANT CHANGE UP
APTT BLD: 47.5 SEC — HIGH (ref 27.5–35.5)
BACTERIA # UR AUTO: NEGATIVE — SIGNIFICANT CHANGE UP
BASOPHILS # BLD AUTO: 0.05 K/UL — SIGNIFICANT CHANGE UP (ref 0–0.2)
BASOPHILS NFR BLD AUTO: 0.8 % — SIGNIFICANT CHANGE UP (ref 0–2)
BILIRUB UR-MCNC: NEGATIVE — SIGNIFICANT CHANGE UP
BUN SERPL-MCNC: 10 MG/DL — SIGNIFICANT CHANGE UP (ref 8–20)
CALCIUM SERPL-MCNC: 8.8 MG/DL — SIGNIFICANT CHANGE UP (ref 8.6–10.2)
CHLORIDE SERPL-SCNC: 104 MMOL/L — SIGNIFICANT CHANGE UP (ref 98–107)
CO2 SERPL-SCNC: 27 MMOL/L — SIGNIFICANT CHANGE UP (ref 22–29)
COLOR SPEC: YELLOW — SIGNIFICANT CHANGE UP
CREAT SERPL-MCNC: 0.91 MG/DL — SIGNIFICANT CHANGE UP (ref 0.5–1.3)
DIFF PNL FLD: ABNORMAL
EOSINOPHIL # BLD AUTO: 0.28 K/UL — SIGNIFICANT CHANGE UP (ref 0–0.5)
EOSINOPHIL NFR BLD AUTO: 4.7 % — SIGNIFICANT CHANGE UP (ref 0–6)
EPI CELLS # UR: SIGNIFICANT CHANGE UP
GLUCOSE SERPL-MCNC: 99 MG/DL — SIGNIFICANT CHANGE UP (ref 70–99)
GLUCOSE UR QL: NEGATIVE MG/DL — SIGNIFICANT CHANGE UP
HCT VFR BLD CALC: 37.5 % — SIGNIFICANT CHANGE UP (ref 34.5–45)
HGB BLD-MCNC: 12.1 G/DL — SIGNIFICANT CHANGE UP (ref 11.5–15.5)
IMM GRANULOCYTES NFR BLD AUTO: 0.5 % — SIGNIFICANT CHANGE UP (ref 0–1.5)
INR BLD: 2.2 RATIO — HIGH (ref 0.88–1.16)
KETONES UR-MCNC: NEGATIVE — SIGNIFICANT CHANGE UP
LEUKOCYTE ESTERASE UR-ACNC: NEGATIVE — SIGNIFICANT CHANGE UP
LYMPHOCYTES # BLD AUTO: 2.17 K/UL — SIGNIFICANT CHANGE UP (ref 1–3.3)
LYMPHOCYTES # BLD AUTO: 36.2 % — SIGNIFICANT CHANGE UP (ref 13–44)
MCHC RBC-ENTMCNC: 26.8 PG — LOW (ref 27–34)
MCHC RBC-ENTMCNC: 32.3 GM/DL — SIGNIFICANT CHANGE UP (ref 32–36)
MCV RBC AUTO: 83 FL — SIGNIFICANT CHANGE UP (ref 80–100)
MONOCYTES # BLD AUTO: 0.43 K/UL — SIGNIFICANT CHANGE UP (ref 0–0.9)
MONOCYTES NFR BLD AUTO: 7.2 % — SIGNIFICANT CHANGE UP (ref 2–14)
NEUTROPHILS # BLD AUTO: 3.04 K/UL — SIGNIFICANT CHANGE UP (ref 1.8–7.4)
NEUTROPHILS NFR BLD AUTO: 50.6 % — SIGNIFICANT CHANGE UP (ref 43–77)
NITRITE UR-MCNC: NEGATIVE — SIGNIFICANT CHANGE UP
PH UR: 5 — SIGNIFICANT CHANGE UP (ref 5–8)
PLATELET # BLD AUTO: 279 K/UL — SIGNIFICANT CHANGE UP (ref 150–400)
POTASSIUM SERPL-MCNC: 4 MMOL/L — SIGNIFICANT CHANGE UP (ref 3.5–5.3)
POTASSIUM SERPL-SCNC: 4 MMOL/L — SIGNIFICANT CHANGE UP (ref 3.5–5.3)
PROT UR-MCNC: 30 MG/DL
PROTHROM AB SERPL-ACNC: 24.6 SEC — HIGH (ref 10.6–13.6)
RBC # BLD: 4.52 M/UL — SIGNIFICANT CHANGE UP (ref 3.8–5.2)
RBC # FLD: 13.8 % — SIGNIFICANT CHANGE UP (ref 10.3–14.5)
RBC CASTS # UR COMP ASSIST: SIGNIFICANT CHANGE UP /HPF (ref 0–4)
SODIUM SERPL-SCNC: 141 MMOL/L — SIGNIFICANT CHANGE UP (ref 135–145)
SP GR SPEC: 1.01 — SIGNIFICANT CHANGE UP (ref 1.01–1.02)
UROBILINOGEN FLD QL: NEGATIVE MG/DL — SIGNIFICANT CHANGE UP
WBC # BLD: 6 K/UL — SIGNIFICANT CHANGE UP (ref 3.8–10.5)
WBC # FLD AUTO: 6 K/UL — SIGNIFICANT CHANGE UP (ref 3.8–10.5)
WBC UR QL: SIGNIFICANT CHANGE UP

## 2021-04-23 PROCEDURE — 84702 CHORIONIC GONADOTROPIN TEST: CPT

## 2021-04-23 PROCEDURE — U0005: CPT

## 2021-04-23 PROCEDURE — 83605 ASSAY OF LACTIC ACID: CPT

## 2021-04-23 PROCEDURE — 71045 X-RAY EXAM CHEST 1 VIEW: CPT

## 2021-04-23 PROCEDURE — 87040 BLOOD CULTURE FOR BACTERIA: CPT

## 2021-04-23 PROCEDURE — 70487 CT MAXILLOFACIAL W/DYE: CPT

## 2021-04-23 PROCEDURE — 80048 BASIC METABOLIC PNL TOTAL CA: CPT

## 2021-04-23 PROCEDURE — U0003: CPT

## 2021-04-23 PROCEDURE — 36415 COLL VENOUS BLD VENIPUNCTURE: CPT

## 2021-04-23 PROCEDURE — 80053 COMPREHEN METABOLIC PANEL: CPT

## 2021-04-23 PROCEDURE — 70552 MRI BRAIN STEM W/DYE: CPT

## 2021-04-23 PROCEDURE — 93005 ELECTROCARDIOGRAM TRACING: CPT

## 2021-04-23 PROCEDURE — 99233 SBSQ HOSP IP/OBS HIGH 50: CPT

## 2021-04-23 PROCEDURE — 99285 EMERGENCY DEPT VISIT HI MDM: CPT | Mod: 25

## 2021-04-23 PROCEDURE — 85610 PROTHROMBIN TIME: CPT

## 2021-04-23 PROCEDURE — 96374 THER/PROPH/DIAG INJ IV PUSH: CPT

## 2021-04-23 PROCEDURE — 96375 TX/PRO/DX INJ NEW DRUG ADDON: CPT

## 2021-04-23 PROCEDURE — 70552 MRI BRAIN STEM W/DYE: CPT | Mod: 26

## 2021-04-23 PROCEDURE — 85730 THROMBOPLASTIN TIME PARTIAL: CPT

## 2021-04-23 PROCEDURE — 85025 COMPLETE CBC W/AUTO DIFF WBC: CPT

## 2021-04-23 PROCEDURE — 81001 URINALYSIS AUTO W/SCOPE: CPT

## 2021-04-23 PROCEDURE — 87086 URINE CULTURE/COLONY COUNT: CPT

## 2021-04-23 RX ORDER — WARFARIN SODIUM 2.5 MG/1
6 TABLET ORAL ONCE
Refills: 0 | Status: COMPLETED | OUTPATIENT
Start: 2021-04-23 | End: 2021-04-23

## 2021-04-23 RX ORDER — ONDANSETRON 8 MG/1
0 TABLET, FILM COATED ORAL
Qty: 0 | Refills: 0 | DISCHARGE
Start: 2021-04-23

## 2021-04-23 RX ORDER — ONDANSETRON 8 MG/1
1 TABLET, FILM COATED ORAL
Qty: 0 | Refills: 0 | DISCHARGE

## 2021-04-23 RX ORDER — WARFARIN SODIUM 2.5 MG/1
3 TABLET ORAL
Qty: 0 | Refills: 0 | DISCHARGE

## 2021-04-23 RX ORDER — AMPICILLIN SODIUM AND SULBACTAM SODIUM 250; 125 MG/ML; MG/ML
3 INJECTION, POWDER, FOR SUSPENSION INTRAMUSCULAR; INTRAVENOUS EVERY 6 HOURS
Refills: 0 | Status: DISCONTINUED | OUTPATIENT
Start: 2021-04-23 | End: 2021-04-23

## 2021-04-23 RX ORDER — CIPROFLOXACIN HCL 0.3 %
2 DROPS OPHTHALMIC (EYE)
Refills: 0 | Status: DISCONTINUED | OUTPATIENT
Start: 2021-04-22 | End: 2021-04-23

## 2021-04-23 RX ORDER — CIPROFLOXACIN 6 MG/ML
5 SUSPENSION INTRATYMPANIC
Qty: 70 | Refills: 0
Start: 2021-04-23 | End: 2021-04-29

## 2021-04-23 RX ORDER — AMPICILLIN SODIUM AND SULBACTAM SODIUM 250; 125 MG/ML; MG/ML
3 INJECTION, POWDER, FOR SUSPENSION INTRAMUSCULAR; INTRAVENOUS ONCE
Refills: 0 | Status: COMPLETED | OUTPATIENT
Start: 2021-04-23 | End: 2021-04-23

## 2021-04-23 RX ORDER — WARFARIN SODIUM 2.5 MG/1
1 TABLET ORAL
Qty: 0 | Refills: 0 | DISCHARGE
Start: 2021-04-23

## 2021-04-23 RX ORDER — OXYCODONE AND ACETAMINOPHEN 5; 325 MG/1; MG/1
1 TABLET ORAL EVERY 4 HOURS
Refills: 0 | Status: DISCONTINUED | OUTPATIENT
Start: 2021-04-23 | End: 2021-04-23

## 2021-04-23 RX ORDER — CIPROFLOXACIN HCL 0.3 %
5 DROPS OPHTHALMIC (EYE)
Refills: 0 | Status: DISCONTINUED | OUTPATIENT
Start: 2021-04-23 | End: 2021-04-23

## 2021-04-23 RX ORDER — AMPICILLIN SODIUM AND SULBACTAM SODIUM 250; 125 MG/ML; MG/ML
INJECTION, POWDER, FOR SUSPENSION INTRAMUSCULAR; INTRAVENOUS
Refills: 0 | Status: DISCONTINUED | OUTPATIENT
Start: 2021-04-23 | End: 2021-04-23

## 2021-04-23 RX ADMIN — GABAPENTIN 100 MILLIGRAM(S): 400 CAPSULE ORAL at 12:42

## 2021-04-23 RX ADMIN — Medication 1 TABLET(S): at 21:22

## 2021-04-23 RX ADMIN — SERTRALINE 50 MILLIGRAM(S): 25 TABLET, FILM COATED ORAL at 18:01

## 2021-04-23 RX ADMIN — WARFARIN SODIUM 6 MILLIGRAM(S): 2.5 TABLET ORAL at 21:22

## 2021-04-23 RX ADMIN — GABAPENTIN 100 MILLIGRAM(S): 400 CAPSULE ORAL at 05:33

## 2021-04-23 RX ADMIN — PANTOPRAZOLE SODIUM 40 MILLIGRAM(S): 20 TABLET, DELAYED RELEASE ORAL at 05:33

## 2021-04-23 RX ADMIN — Medication 2 DROP(S): at 05:33

## 2021-04-23 RX ADMIN — Medication 1 MILLIGRAM(S): at 08:30

## 2021-04-23 RX ADMIN — Medication 1 TABLET(S): at 10:37

## 2021-04-23 RX ADMIN — PIPERACILLIN AND TAZOBACTAM 25 GRAM(S): 4; .5 INJECTION, POWDER, LYOPHILIZED, FOR SOLUTION INTRAVENOUS at 01:16

## 2021-04-23 RX ADMIN — Medication 5 DROP(S): at 12:41

## 2021-04-23 RX ADMIN — GABAPENTIN 100 MILLIGRAM(S): 400 CAPSULE ORAL at 21:22

## 2021-04-23 RX ADMIN — AMPICILLIN SODIUM AND SULBACTAM SODIUM 200 GRAM(S): 250; 125 INJECTION, POWDER, FOR SUSPENSION INTRAMUSCULAR; INTRAVENOUS at 12:40

## 2021-04-23 RX ADMIN — Medication 650 MILLIGRAM(S): at 17:40

## 2021-04-23 RX ADMIN — Medication 1 TABLET(S): at 18:01

## 2021-04-23 RX ADMIN — AMPICILLIN SODIUM AND SULBACTAM SODIUM 200 GRAM(S): 250; 125 INJECTION, POWDER, FOR SUSPENSION INTRAMUSCULAR; INTRAVENOUS at 21:22

## 2021-04-23 RX ADMIN — Medication 250 MILLIGRAM(S): at 02:47

## 2021-04-23 RX ADMIN — Medication 5 DROP(S): at 18:01

## 2021-04-23 NOTE — DISCHARGE NOTE PROVIDER - NSDCMRMEDTOKEN_GEN_ALL_CORE_FT
Citrucel 500 mg oral tablet: 2 tab(s) orally 2 times a day  dicyclomine 20 mg oral tablet: 1 tab(s) orally 3 times a day  famotidine 40 mg oral tablet: 1 tab(s) orally once a day (at bedtime)  gabapentin 100 mg oral capsule: 1 cap(s) orally 3 times a day  loperamide 2 mg oral capsule: 1 cap(s) orally 2 times a day  meclizine 25 mg oral tablet: 1 tab(s) orally 3 times a day  omeprazole 40 mg oral delayed release capsule: 1 cap(s) orally 2 times a day  ondansetron 2 mg/mL injectable solution:  injectable   sertraline 50 mg oral tablet: 1 tab(s) orally once a day (in the morning)  sucralfate 1 g oral tablet: 1 tab(s) orally 4 times a day (before meals and at bedtime)  warfarin 6 mg oral tablet: 1 tab(s) orally once   Augmentin 875 mg-125 mg oral tablet: 875 milligram(s) orally every 12 hours   ciprofloxacin 6% otic suspension: 5 each in each affected ear every 12 hours   Citrucel 500 mg oral tablet: 2 tab(s) orally 2 times a day  dicyclomine 20 mg oral tablet: 1 tab(s) orally 3 times a day  famotidine 40 mg oral tablet: 1 tab(s) orally once a day (at bedtime)  gabapentin 100 mg oral capsule: 1 cap(s) orally 3 times a day  loperamide 2 mg oral capsule: 1 cap(s) orally 2 times a day  meclizine 25 mg oral tablet: 1 tab(s) orally 3 times a day  omeprazole 40 mg oral delayed release capsule: 1 cap(s) orally 2 times a day  ondansetron 2 mg/mL injectable solution:  injectable   sertraline 50 mg oral tablet: 1 tab(s) orally once a day (in the morning)  sucralfate 1 g oral tablet: 1 tab(s) orally 4 times a day (before meals and at bedtime)  warfarin 6 mg oral tablet: 1 tab(s) orally once   Augmentin 875 mg-125 mg oral tablet: 875 milligram(s) orally every 12 hours   ciprofloxacin 6% otic suspension: 5 each in each affected ear every 12 hours   Citrucel 500 mg oral tablet: 2 tab(s) orally 2 times a day  dicyclomine 20 mg oral tablet: 1 tab(s) orally 3 times a day  famotidine 40 mg oral tablet: 1 tab(s) orally once a day (at bedtime)  gabapentin 100 mg oral capsule: 1 cap(s) orally 3 times a day  loperamide 2 mg oral capsule: 1 cap(s) orally 2 times a day  meclizine 25 mg oral tablet: 1 tab(s) orally 3 times a day  omeprazole 40 mg oral delayed release capsule: 1 cap(s) orally 2 times a day  ondansetron 2 mg/mL injectable solution:  injectable   oxycodone-acetaminophen 5 mg-325 mg oral tablet: 1 tab(s) orally every 8 hours, As Needed -Moderate Pain (4 - 6) MDD:3 tablets  sertraline 50 mg oral tablet: 1 tab(s) orally once a day (in the morning)  sucralfate 1 g oral tablet: 1 tab(s) orally 4 times a day (before meals and at bedtime)  warfarin 6 mg oral tablet: 1 tab(s) orally once

## 2021-04-23 NOTE — DISCHARGE NOTE PROVIDER - CARE PROVIDER_API CALL
Jermaine De La Rosa)  Otolaryngology  [address] [place] [address], [place] [address]  Phone: [phone]  Fax: [phone]  Follow Up Time: 1-3 days

## 2021-04-23 NOTE — DISCHARGE NOTE PROVIDER - HOSPITAL COURSE
Brief Hospital course   41 y/o female with h/o cervical cancer got radiation and chemo in the past, h/o dvt , PE , anxiety / depression. On exam pt was in mild distress which has improved. Case discussed with ENT, as per ENT, pt has cholesteotoma and will need outpt f/u next week. Will dc on PO abx and ear drops    Discharge exam :  CONSTITUTIONAL: NAD, well-developed, well-groomed  ENMT: Left ear pain on exam ; unable to inspect tympanic membrane Moist oral mucosa, no pharyngeal injection or exudates; normal dentition  RESPIRATORY: Normal respiratory effort; lungs are clear to auscultation bilaterally  CARDIOVASCULAR: Regular rate and rhythm, normal S1 and S2, no murmur/rub/gallop; No lower extremity edema; Peripheral pulses are 2+ bilaterally  ABDOMEN: Nontender to palpation, normoactive bowel sounds, no rebound/guarding; No hepatosplenomegaly  MUSCLOSKELETAL:  no clubbing or cyanosis of digits; no joint swelling or tenderness to palpation  PSYCH: A+O to person, place, and time; affect appropriate  NEUROLOGY: CN 2-12 are intact and symmetric; no gross sensory deficits;   SKIN: No rashes; no palpable lesions        Time spent on dsc : 40 minutes

## 2021-04-23 NOTE — PROGRESS NOTE ADULT - ASSESSMENT
39 y/o female with h/o cervical cancer got radiation and chemo in the past, h/o dvt , PE , anxiety / depression admitted for imaging findings cocnernng for mastoiditis    Plan  Acute OM with cholesteatoma  D/W ENT  Advised to change abx , can use Augmentin and cipro drops  Due to discomfort will dsc in am and continue analgesia PRN  Stop Vanc/Zosyn,   Start Unasyn, change to Augmentin on dsc   Cipro drops 5 drops BID  ENT follow up (Dr Rose within 2-3 days) for definitive management     Depression  No SI  Continue Zoloft    H/O PE  Continue Warfarin   INR therapeutic     - H/o PE / dvt , continue coumadin.

## 2021-04-23 NOTE — DISCHARGE NOTE PROVIDER - NSDCCPCAREPLAN_GEN_ALL_CORE_FT
PRINCIPAL DISCHARGE DIAGNOSIS  Diagnosis: Mastoiditis  Assessment and Plan of Treatment: Unliekly mastoidtis, acute M with cholesteotoma       PRINCIPAL DISCHARGE DIAGNOSIS  Diagnosis: Mastoiditis  Assessment and Plan of Treatment: Unliekly mastoidtis, acute M with cholesteotoma, continue abx as prescribed, outpatient ENT follow up       PRINCIPAL DISCHARGE DIAGNOSIS  Diagnosis: Mastoiditis  Assessment and Plan of Treatment: Unliekly mastoidtis, acute M with cholesteotoma, continue abx as prescribed, outpatient ENT follow up in 2-3 days from discharge

## 2021-04-23 NOTE — CHART NOTE - NSCHARTNOTEFT_GEN_A_CORE
PA event note    Called by RN. Pt wanting to be discharged home tonight and not tomorrow am. According to kiel GAMBINO note, plan was for pt to stay today because of pain w/ plan for DC home in am. Per RN, pt is not complaining of any pain and asking to be discharged home now with her . Discharge note was already completed by day MD and abx were sent to pts pharmacy already as well. Pt stable for DC home with outpatinet follow up with ENT as per DC instructions. DC order placed.

## 2021-04-23 NOTE — DISCHARGE NOTE PROVIDER - NSDCFUSCHEDAPPT_GEN_ALL_CORE_FT
TRICIA STEVENS ; 04/25/2021 ; NPP DisEmerg OP [address]  TRICIA STEVENS ; 07/07/2021 ; \[facility]\"" Rad  Opd TRICIA Gage ; 07/08/2021 ; \[facility]\"" Rad BrstImag 620 Opd TRICIA Gage ; 07/08/2021 ; \[facility]\"" Rad Us 620 Opd [facility]

## 2021-04-23 NOTE — PROGRESS NOTE ADULT - SUBJECTIVE AND OBJECTIVE BOX
[facility] Division of Hospital Medicine    Chief Complaint:  Ear pain    SUBJECTIVE / OVERNIGHT EVENTS:  Pt admitted with concern for mastoiditis  Case discussed with ENT, images reviewed suspect chronic cholesteatoma with acute OM  Patient denies chest pain, SOB, abd pain, N/V, fever, chills, dysuria or any other complaints. All remainder ROS negative.     MEDICATIONS  (STANDING):  ampicillin/sulbactam  IVPB      ampicillin/sulbactam  IVPB 3 Gram(s) IV Intermittent every 6 hours  ciprofloxacin  0.3% Otic Solution 5 Drop(s) Left Ear two times a day  gabapentin 100 milliGRAM(s) Oral three times a day  lactobacillus acidophilus 1 Tablet(s) Oral three times a day with meals  pantoprazole    Tablet 40 milliGRAM(s) Oral before breakfast  sertraline 50 milliGRAM(s) Oral daily    MEDICATIONS  (PRN):  acetaminophen   Tablet .. 650 milliGRAM(s) Oral every 6 hours PRN Temp greater or equal to 38C (100.4F), Mild Pain (1 - 3), Moderate Pain (4 - 6)  ondansetron Injectable 4 milliGRAM(s) IV Push every 6 hours PRN Nausea and/or Vomiting  oxycodone    5 mG/acetaminophen 325 mG 1 Tablet(s) Oral every 4 hours PRN Moderate Pain (4 - 6)        I&O's Summary      PHYSICAL EXAM:  Vital Signs Last 24 Hrs  T(C): 37 (2021 16:51), Max: 37.1 (2021 20:56)  T(F): 98.6 (2021 16:51), Max: 98.8 (2021 20:56)  HR: 94 (2021 16:51) (76 - 97)  BP: 135/83 (2021 16:51) (115/79 - 136/76)  BP(mean): --  RR: 18 (2021 16:51) (18 - 18)  SpO2: 98% (2021 16:51) (94% - 99%)      CONSTITUTIONAL: NAD, well-developed, well-groomed  ENMT: Left ear pain on exam ; unable to inspect tympanic membrane Moist oral mucosa, no pharyngeal injection or exudates; normal dentition  RESPIRATORY: Normal respiratory effort; lungs are clear to auscultation bilaterally  CARDIOVASCULAR: Regular rate and rhythm, normal S1 and S2, no murmur/rub/gallop; No lower extremity edema; Peripheral pulses are 2+ bilaterally  ABDOMEN: Nontender to palpation, normoactive bowel sounds, no rebound/guarding; No hepatosplenomegaly  MUSCLOSKELETAL:  no clubbing or cyanosis of digits; no joint swelling or tenderness to palpation  PSYCH: A+O to person, place, and time; affect appropriate  NEUROLOGY: CN 2-12 are intact and symmetric; no gross sensory deficits;   SKIN: No rashes; no palpable lesions    LABS:                        12.1   6.00  )-----------( 279      ( 2021 07:39 )             37.5     04-23    141  |  104  |  10.0  ----------------------------<  99  4.0   |  27.0  |  0.91    Ca    8.8      2021 07:39    TPro  7.8  /  Alb  4.2  /  TBili  <0.2<L>  /  DBili  x   /  AST  14  /  ALT  16  /  AlkPhos  100  04-22    PT/INR - ( 2021 07:39 )   PT: 24.6 sec;   INR: 2.20 ratio         PTT - ( 2021 07:39 )  PTT:47.5 sec      Urinalysis Basic - ( 2021 00:55 )    Color: Yellow / Appearance: Clear / S.010 / pH: x  Gluc: x / Ketone: Negative  / Bili: Negative / Urobili: Negative mg/dL   Blood: x / Protein: 30 mg/dL / Nitrite: Negative   Leuk Esterase: Negative / RBC: 0-2 /HPF / WBC 0-2   Sq Epi: x / Non Sq Epi: Occasional / Bacteria: Negative        CAPILLARY BLOOD GLUCOSE            RADIOLOGY & ADDITIONAL TESTS:  Results Reviewed:   Imaging Personally Reviewed:  Electrocardiogram Personally Reviewed:

## 2021-04-24 LAB
CULTURE RESULTS: NO GROWTH — SIGNIFICANT CHANGE UP
SPECIMEN SOURCE: SIGNIFICANT CHANGE UP

## 2021-04-25 ENCOUNTER — APPOINTMENT (OUTPATIENT)
Dept: DISASTER EMERGENCY | Facility: OTHER | Age: 41
End: 2021-04-25

## 2021-04-27 LAB
CULTURE RESULTS: SIGNIFICANT CHANGE UP
CULTURE RESULTS: SIGNIFICANT CHANGE UP
SPECIMEN SOURCE: SIGNIFICANT CHANGE UP
SPECIMEN SOURCE: SIGNIFICANT CHANGE UP

## 2021-04-30 RX ORDER — SUCRALFATE 1 G/1
1 TABLET ORAL 4 TIMES DAILY
Qty: 120 | Refills: 3 | Status: DISCONTINUED | COMMUNITY
Start: 2020-12-14 | End: 2021-04-30

## 2021-06-07 ENCOUNTER — TRANSCRIPTION ENCOUNTER (OUTPATIENT)
Age: 41
End: 2021-06-07

## 2021-06-22 ENCOUNTER — RX RENEWAL (OUTPATIENT)
Age: 41
End: 2021-06-22

## 2021-07-01 ENCOUNTER — RX CHANGE (OUTPATIENT)
Age: 41
End: 2021-07-01

## 2021-07-07 ENCOUNTER — OUTPATIENT (OUTPATIENT)
Dept: OUTPATIENT SERVICES | Facility: HOSPITAL | Age: 41
LOS: 1 days | End: 2021-07-07
Payer: COMMERCIAL

## 2021-07-07 ENCOUNTER — APPOINTMENT (OUTPATIENT)
Dept: MRI IMAGING | Facility: CLINIC | Age: 41
End: 2021-07-07
Payer: COMMERCIAL

## 2021-07-07 DIAGNOSIS — Z86.711 PERSONAL HISTORY OF PULMONARY EMBOLISM: Chronic | ICD-10-CM

## 2021-07-07 DIAGNOSIS — Z00.8 ENCOUNTER FOR OTHER GENERAL EXAMINATION: ICD-10-CM

## 2021-07-07 DIAGNOSIS — Z96.22 MYRINGOTOMY TUBE(S) STATUS: Chronic | ICD-10-CM

## 2021-07-07 DIAGNOSIS — Z98.89 OTHER SPECIFIED POSTPROCEDURAL STATES: Chronic | ICD-10-CM

## 2021-07-07 DIAGNOSIS — R93.89 ABNORMAL FINDINGS ON DIAGNOSTIC IMAGING OF OTHER SPECIFIED BODY STRUCTURES: ICD-10-CM

## 2021-07-07 DIAGNOSIS — C53.9 MALIGNANT NEOPLASM OF CERVIX UTERI, UNSPECIFIED: Chronic | ICD-10-CM

## 2021-07-07 DIAGNOSIS — R93.3 ABNORMAL FINDINGS ON DIAGNOSTIC IMAGING OF OTHER PARTS OF DIGESTIVE TRACT: Chronic | ICD-10-CM

## 2021-07-07 PROCEDURE — A9585: CPT

## 2021-07-07 PROCEDURE — 77049 MRI BREAST C-+ W/CAD BI: CPT | Mod: 26

## 2021-07-07 PROCEDURE — C8937: CPT

## 2021-07-07 PROCEDURE — C8908: CPT

## 2021-07-08 ENCOUNTER — OUTPATIENT (OUTPATIENT)
Dept: OUTPATIENT SERVICES | Facility: HOSPITAL | Age: 41
LOS: 1 days | End: 2021-07-08

## 2021-07-08 DIAGNOSIS — Z98.89 OTHER SPECIFIED POSTPROCEDURAL STATES: Chronic | ICD-10-CM

## 2021-07-08 DIAGNOSIS — R93.3 ABNORMAL FINDINGS ON DIAGNOSTIC IMAGING OF OTHER PARTS OF DIGESTIVE TRACT: Chronic | ICD-10-CM

## 2021-07-08 DIAGNOSIS — Z86.711 PERSONAL HISTORY OF PULMONARY EMBOLISM: Chronic | ICD-10-CM

## 2021-07-08 DIAGNOSIS — Z96.22 MYRINGOTOMY TUBE(S) STATUS: Chronic | ICD-10-CM

## 2021-07-08 DIAGNOSIS — Z00.8 ENCOUNTER FOR OTHER GENERAL EXAMINATION: ICD-10-CM

## 2021-07-08 DIAGNOSIS — C53.9 MALIGNANT NEOPLASM OF CERVIX UTERI, UNSPECIFIED: Chronic | ICD-10-CM

## 2021-07-08 DIAGNOSIS — R92.8 OTHER ABNORMAL AND INCONCLUSIVE FINDINGS ON DIAGNOSTIC IMAGING OF BREAST: ICD-10-CM

## 2021-07-16 ENCOUNTER — OUTPATIENT (OUTPATIENT)
Dept: OUTPATIENT SERVICES | Facility: HOSPITAL | Age: 41
LOS: 1 days | End: 2021-07-16
Payer: COMMERCIAL

## 2021-07-16 ENCOUNTER — RESULT REVIEW (OUTPATIENT)
Age: 41
End: 2021-07-16

## 2021-07-16 ENCOUNTER — APPOINTMENT (OUTPATIENT)
Dept: ULTRASOUND IMAGING | Facility: CLINIC | Age: 41
End: 2021-07-16
Payer: COMMERCIAL

## 2021-07-16 ENCOUNTER — APPOINTMENT (OUTPATIENT)
Dept: MAMMOGRAPHY | Facility: CLINIC | Age: 41
End: 2021-07-16

## 2021-07-16 DIAGNOSIS — R93.3 ABNORMAL FINDINGS ON DIAGNOSTIC IMAGING OF OTHER PARTS OF DIGESTIVE TRACT: Chronic | ICD-10-CM

## 2021-07-16 DIAGNOSIS — C53.9 MALIGNANT NEOPLASM OF CERVIX UTERI, UNSPECIFIED: Chronic | ICD-10-CM

## 2021-07-16 DIAGNOSIS — Z98.89 OTHER SPECIFIED POSTPROCEDURAL STATES: Chronic | ICD-10-CM

## 2021-07-16 DIAGNOSIS — Z96.22 MYRINGOTOMY TUBE(S) STATUS: Chronic | ICD-10-CM

## 2021-07-16 DIAGNOSIS — Z12.39 ENCOUNTER FOR OTHER SCREENING FOR MALIGNANT NEOPLASM OF BREAST: ICD-10-CM

## 2021-07-16 DIAGNOSIS — Z86.711 PERSONAL HISTORY OF PULMONARY EMBOLISM: Chronic | ICD-10-CM

## 2021-07-16 PROCEDURE — 77066 DX MAMMO INCL CAD BI: CPT | Mod: 26

## 2021-07-16 PROCEDURE — G0279: CPT

## 2021-07-16 PROCEDURE — 76641 ULTRASOUND BREAST COMPLETE: CPT | Mod: 26,50

## 2021-07-16 PROCEDURE — 77066 DX MAMMO INCL CAD BI: CPT

## 2021-07-16 PROCEDURE — 76641 ULTRASOUND BREAST COMPLETE: CPT

## 2021-07-16 PROCEDURE — G0279: CPT | Mod: 26

## 2021-07-19 ENCOUNTER — RX RENEWAL (OUTPATIENT)
Age: 41
End: 2021-07-19

## 2021-08-30 ENCOUNTER — TRANSCRIPTION ENCOUNTER (OUTPATIENT)
Age: 41
End: 2021-08-30

## 2021-09-03 ENCOUNTER — RX RENEWAL (OUTPATIENT)
Age: 41
End: 2021-09-03

## 2021-10-28 NOTE — ED STATDOCS - PRINCIPAL DIAGNOSIS
SUBJECTIVE:  This pleasant 70 [employer]     Comment: Capsule Tech   Week: Not on file   • Minutes of Exercise per Session: Not on file   Stress:    • Social Determinants: Stress: Not on file   Social Connections:    • Social Determinants: Social Connections: Not on file   Intimate Partner Violence: Not At Risk   • Social Determinants: Intimate Partner Violence Past Fear: No   • Social Determinants: Intimate Partner Violence Current Fear: No     Alcohol Use: no    REVIEW OF SYSTEMS:  The patient denies headaches, diplopia, field cut, or swallowing  difficulty.  No skin rash or changing nevi.  No chest pain, chest  pressure, shortness of breath, PND(paroxysmal nocturnal dyspnea), orthopnea or hemoptysis.  No nausea, vomiting, diarrhea, constipation, melena, hematochezia or hematemesis.  No change in urination.  No dysuria, frequency, hematuria, or urgency.  No joint swelling.  No anhedonia, depression,  or sleep  disturbance reported.     PHYSICAL EXAMINATION:  GENERAL:  Pleasant, cooperative, in no distress.  Visit Vitals  /74 (BP Location: LUE - Left upper extremity, Patient Position: Sitting, Cuff Size: Regular)   Pulse 70   Temp 97.4 °F (36.3 °C) (Temporal)   Ht 6' (1.829 m)   Wt 113 kg (249 lb 1.9 oz)   SpO2 97%   BMI 33.79 kg/m²     HEENT:  Head normocephalic, atraumatic.  Eyes:  Extraocular movements  intact.  Pupils are equally responsive and reactive to light. Conjunctivae  pink.  Sclerae anicteric.  Ears:  External ears are normal.  Tympanic  membranes and external auditory canals are normal.  Nasopharyngeal mucosa  is normal.  Oropharyngeal mucosa is normal.  NECK:  Supple without mass.  No thyromegaly or adenopathy.  No carotid  bruits.  LUNGS:  Lung fields are clear to auscultation and percussion.  CHEST WALL:  No mass.  HEART:  Regular rate and rhythm.  Normal S1 and S2.  No murmur, rub,  gallop, or click.  No JVD(jugular venous distension) or hepatojugular reflux.  ABDOMEN:  Soft and nontender.  No mass.  No hepatosplenomegaly.  GENITOURINARY:  Testicles are  descended bilaterally.  No nodules.  No  hernia.  Phallus is normal.  LOWER EXTREMITIES:  No edema.  Dorsalis pedis and posterior tibial pulses  are normal.  SKIN SCREEN: No dysplastic lesions or rashes noted.  LYMPHATIC:  No axillary, supraclavicular, or inguinal adenopathy.  VASCULAR:  No flank or femoral bruits.    LABORATORY DATA:    No visits with results within 1 Week(s) from this visit.   Latest known visit with results is:   Admission on 10/19/2021, Discharged on 10/20/2021   Component Date Value Ref Range Status   • COLOR, URINALYSIS 10/19/2021 Yellow   Final   • APPEARANCE, URINALYSIS 10/19/2021 Clear   Final   • GLUCOSE, URINALYSIS 10/19/2021 Negative  Negative mg/dL Final   • BILIRUBIN, URINALYSIS 10/19/2021 Negative  Negative Final   • KETONES, URINALYSIS 10/19/2021 Negative  Negative mg/dL Final   • SPECIFIC GRAVITY, URINALYSIS 10/19/2021 1.019  1.005 - 1.030 Final   • OCCULT BLOOD, URINALYSIS 10/19/2021 Negative  Negative Final   • PH, URINALYSIS 10/19/2021 6.0  5.0 - 7.0 Final   • PROTEIN, URINALYSIS 10/19/2021 100 * Negative mg/dL Final   • UROBILINOGEN, URINALYSIS 10/19/2021 0.2  0.2, 1.0 mg/dL Final   • NITRITE, URINALYSIS 10/19/2021 Negative  Negative Final   • LEUKOCYTE ESTERASE, URINALYSIS 10/19/2021 Negative  Negative Final   • SQUAMOUS EPITHELIAL, URINALYSIS 10/19/2021 None Seen  None Seen, 1 to 5 /hpf Final   • ERYTHROCYTES, URINALYSIS 10/19/2021 None Seen  None Seen, 1 to 2 /hpf Final   • LEUKOCYTES, URINALYSIS 10/19/2021 1 to 5  None Seen, 1 to 5 /hpf Final   • BACTERIA, URINALYSIS 10/19/2021 None Seen  None Seen /hpf Final   • HYALINE CASTS, URINALYSIS 10/19/2021 None Seen  None Seen, 1 to 5 /lpf Final   • MUCUS 10/19/2021 Present   Final   • Sodium 10/19/2021 139  135 - 145 mmol/L Final   • Potassium 10/19/2021 4.0  3.4 - 5.1 mmol/L Final   • Chloride 10/19/2021 100  98 - 107 mmol/L Final   • Carbon Dioxide 10/19/2021 31  21 - 32 mmol/L Final   • Anion Gap 10/19/2021 12  10 - 20 mmol/L  Final   • Glucose 10/19/2021 170* 70 - 99 mg/dL Final   • BUN 10/19/2021 24* 6 - 20 mg/dL Final   • Creatinine 10/19/2021 1.22* 0.67 - 1.17 mg/dL Final   • Glomerular Filtration Rate 10/19/2021 60  >=60 Final   • BUN/ Creatinine Ratio 10/19/2021 20  7 - 25 Final   • Calcium 10/19/2021 9.4  8.4 - 10.2 mg/dL Final   • Bilirubin, Total 10/19/2021 0.4  0.2 - 1.0 mg/dL Final   • GOT/AST 10/19/2021 22  <=37 Units/L Final   • GPT/ALT 10/19/2021 27  <64 Units/L Final   • Alkaline Phosphatase 10/19/2021 56  45 - 117 Units/L Final   • Albumin 10/19/2021 3.8  3.6 - 5.1 g/dL Final   • Protein, Total 10/19/2021 7.4  6.4 - 8.2 g/dL Final   • Globulin 10/19/2021 3.6  2.0 - 4.0 g/dL Final   • A/G Ratio 10/19/2021 1.1  1.0 - 2.4 Final   • PTT 10/19/2021 27  22 - 30 sec Final   • Prothrombin Time 10/19/2021 11.0  9.7 - 11.8 sec Final   • INR 10/19/2021 1.1    Final   • Magnesium 10/19/2021 1.7  1.7 - 2.4 mg/dL Final   • Ventricular Rate EKG/Min (BPM) 10/19/2021 73   Final   • Atrial Rate (BPM) 10/19/2021 73   Final   • MD-Interval (MSEC) 10/19/2021 184   Final   • QRS-Interval (MSEC) 10/19/2021 112   Final   • QT-Interval (MSEC) 10/19/2021 392   Final   • QTc 10/19/2021 432   Final   • P Axis (Degrees) 10/19/2021 74   Final   • R Axis (Degrees) 10/19/2021 73   Final   • T Axis (Degrees) 10/19/2021 -58   Final   • REPORT TEXT 10/19/2021    Final                    Value:Normal sinus rhythm  Possible  Inferior infarct  , age undetermined  ST And  T wave abnormality, consider lateral ischemia  Abnormal ECG  No previous ECGs available  Confirmed by RAUL GAMA DO ([id number]) on 10/19/2021 8:56:36 AM     • WBC 10/19/2021 7.0  Nausea and vomiting

## 2021-11-15 NOTE — DISCHARGE NOTE PROVIDER - NSDCADMDATE_GEN_ALL_CORE_FT
22-Apr-2021 21:29 Posterior Auricular Interpolation Flap Text: A decision was made to reconstruct the defect utilizing an interpolation axial flap and a staged reconstruction.  A telfa template was made of the defect.  This telfa template was then used to outline the posterior auricular interpolation flap.  The donor area for the pedicle flap was then injected with anesthesia.  The flap was excised through the skin and subcutaneous tissue down to the layer of the underlying musculature.  The pedicle flap was carefully excised within this deep plane to maintain its blood supply.  The edges of the donor site were undermined.   The donor site was closed in a primary fashion.  The pedicle was then rotated into position and sutured.  Once the tube was sutured into place, adequate blood supply was confirmed with blanching and refill.  The pedicle was then wrapped with xeroform gauze and dressed appropriately with a telfa and gauze bandage to ensure continued blood supply and protect the attached pedicle.

## 2022-01-12 ENCOUNTER — OUTPATIENT (OUTPATIENT)
Dept: OUTPATIENT SERVICES | Facility: HOSPITAL | Age: 42
LOS: 1 days | End: 2022-01-12

## 2022-01-12 DIAGNOSIS — Z96.22 MYRINGOTOMY TUBE(S) STATUS: Chronic | ICD-10-CM

## 2022-01-12 DIAGNOSIS — Z98.89 OTHER SPECIFIED POSTPROCEDURAL STATES: Chronic | ICD-10-CM

## 2022-01-12 DIAGNOSIS — Z00.8 ENCOUNTER FOR OTHER GENERAL EXAMINATION: ICD-10-CM

## 2022-01-12 DIAGNOSIS — Z86.711 PERSONAL HISTORY OF PULMONARY EMBOLISM: Chronic | ICD-10-CM

## 2022-01-12 DIAGNOSIS — C53.9 MALIGNANT NEOPLASM OF CERVIX UTERI, UNSPECIFIED: Chronic | ICD-10-CM

## 2022-01-12 DIAGNOSIS — R92.8 OTHER ABNORMAL AND INCONCLUSIVE FINDINGS ON DIAGNOSTIC IMAGING OF BREAST: ICD-10-CM

## 2022-01-12 DIAGNOSIS — R93.3 ABNORMAL FINDINGS ON DIAGNOSTIC IMAGING OF OTHER PARTS OF DIGESTIVE TRACT: Chronic | ICD-10-CM

## 2022-01-13 ENCOUNTER — RESULT REVIEW (OUTPATIENT)
Age: 42
End: 2022-01-13

## 2022-01-24 ENCOUNTER — RESULT REVIEW (OUTPATIENT)
Age: 42
End: 2022-01-24

## 2022-01-24 ENCOUNTER — APPOINTMENT (OUTPATIENT)
Dept: GASTROENTEROLOGY | Facility: CLINIC | Age: 42
End: 2022-01-24
Payer: COMMERCIAL

## 2022-01-24 ENCOUNTER — NON-APPOINTMENT (OUTPATIENT)
Age: 42
End: 2022-01-24

## 2022-01-24 VITALS
BODY MASS INDEX: 34.15 KG/M2 | HEIGHT: 64 IN | WEIGHT: 200 LBS | DIASTOLIC BLOOD PRESSURE: 78 MMHG | RESPIRATION RATE: 14 BRPM | TEMPERATURE: 98.7 F | HEART RATE: 90 BPM | SYSTOLIC BLOOD PRESSURE: 124 MMHG | OXYGEN SATURATION: 98 %

## 2022-01-24 DIAGNOSIS — R68.81 EARLY SATIETY: ICD-10-CM

## 2022-01-24 DIAGNOSIS — Z01.818 ENCOUNTER FOR OTHER PREPROCEDURAL EXAMINATION: ICD-10-CM

## 2022-01-24 DIAGNOSIS — R19.8 OTHER SPECIFIED SYMPTOMS AND SIGNS INVOLVING THE DIGESTIVE SYSTEM AND ABDOMEN: ICD-10-CM

## 2022-01-24 PROCEDURE — 99213 OFFICE O/P EST LOW 20 MIN: CPT

## 2022-01-24 RX ORDER — FAMOTIDINE 10 MG
TABLET,CHEWABLE ORAL
Refills: 0 | Status: DISCONTINUED | COMMUNITY
End: 2022-01-24

## 2022-01-24 RX ORDER — DICYCLOMINE HYDROCHLORIDE 20 MG/1
20 TABLET ORAL
Qty: 90 | Refills: 2 | Status: DISCONTINUED | COMMUNITY
Start: 2020-04-28 | End: 2022-01-24

## 2022-01-24 RX ORDER — LOPERAMIDE HYDROCHLORIDE 2 MG/1
2 CAPSULE ORAL TWICE DAILY
Qty: 60 | Refills: 0 | Status: DISCONTINUED | COMMUNITY
End: 2022-01-24

## 2022-01-24 NOTE — REASON FOR VISIT
[Follow-Up: _____] : a [unfilled] follow-up visit [FreeTextEntry1] : GERD, dysphagia, early satiety, nausea and vomiting

## 2022-01-24 NOTE — HISTORY OF PRESENT ILLNESS
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 Last seen by Dr. Ovalle in December 2020. She has had chronic GERD for over 10 years. For the last 6-8 months her symptoms have been progressively worsening. She reports almost daily nausea, vomiting, globus sensation, choking, early satiety, decreased appetite, and dysphagia to both solids and liquids. She is currently only on a liquid diet. She currently takes Omeprazole 40 mg BID, Famotidine 40 mg QHS, and Sucralfate 1 G QID, however she has been taking Omeprazole up to 4 times a day as well as using her mother's Pantoprazole 1-2 times a day without relief. Last EGD was in 2020 and showed reflux esophagitis. She denies any abdominal pain or weight loss. \par \par Last colonoscopy was in 2020 and was normal. [de-identified] : as noted above [de-identified] : as noted above

## 2022-01-24 NOTE — PHYSICAL EXAM
[General Appearance - Alert] : alert [General Appearance - In No Acute Distress] : in no acute distress [Sclera] : the sclera and conjunctiva were normal [PERRL With Normal Accommodation] : pupils were equal in size, round, and reactive to light [Extraocular Movements] : extraocular movements were intact [Outer Ear] : the ears and nose were normal in appearance [Oropharynx] : the oropharynx was normal [Neck Appearance] : the appearance of the neck was normal [Neck Cervical Mass (___cm)] : no neck mass was observed [Jugular Venous Distention Increased] : there was no jugular-venous distention [Thyroid Diffuse Enlargement] : the thyroid was not enlarged [Thyroid Nodule] : there were no palpable thyroid nodules [Auscultation Breath Sounds / Voice Sounds] : lungs were clear to auscultation bilaterally [Heart Rate And Rhythm] : heart rate was normal and rhythm regular [Heart Sounds] : normal S1 and S2 [Heart Sounds Gallop] : no gallops [Murmurs] : no murmurs [Heart Sounds Pericardial Friction Rub] : no pericardial rub [Regular-Premature Beats] : the rhythm was regular with premature beats [Bowel Sounds] : normal bowel sounds [Abdomen Soft] : soft [Abdomen Tenderness] : non-tender [Abdomen Mass (___ Cm)] : no abdominal mass palpated [Abnormal Walk] : normal gait [Nail Clubbing] : no clubbing  or cyanosis of the fingernails [Musculoskeletal - Swelling] : no joint swelling seen [Motor Tone] : muscle strength and tone were normal [Skin Color & Pigmentation] : normal skin color and pigmentation [Skin Turgor] : normal skin turgor [] : no rash [Oriented To Time, Place, And Person] : oriented to person, place, and time [Impaired Insight] : insight and judgment were intact [Affect] : the affect was normal

## 2022-01-24 NOTE — ASSESSMENT
[FreeTextEntry1] : 42 y/o female presenting with a 6-8 month history of worsening GERD symptoms. +dysphagia to both solids and liquids as well as chronic globus sensation. Will send for Xray esophagram and modified barium swallow. \par \par Schedule EGD for further evaluation. I have discussed the indications (including but not limited to ruling out García's esophagus, AVM's, H. pylori, and malignancies), benefits, risks (including but not limited to reaction to the anesthesia, infection, bleeding, and perforation), and alternatives to an endoscopy. The patient understands all options and has agreed to endoscopy and is medically optimized for the planned procedure. \par \par GERD diet\par Start Pantoprazole 40 mg BID\par Increase Famotidine 40 mg to BID\par Continue Sucralfate 1 G QID\par \par Instructed her to not take more than the prescribed doses of PPIs, verbalized understanding\par \par Cardiac clearance prior to procedure, will need to stop Coumadin for 7 days prior\par \par Labs prior to procedure\par \par Follow up with Dr. Ovalle

## 2022-01-25 ENCOUNTER — TRANSCRIPTION ENCOUNTER (OUTPATIENT)
Age: 42
End: 2022-01-25

## 2022-01-25 ENCOUNTER — NON-APPOINTMENT (OUTPATIENT)
Age: 42
End: 2022-01-25

## 2022-01-25 LAB
ALBUMIN SERPL ELPH-MCNC: 4.9 G/DL
ALP BLD-CCNC: 87 U/L
ALT SERPL-CCNC: 22 U/L
ANION GAP SERPL CALC-SCNC: 14 MMOL/L
AST SERPL-CCNC: 18 U/L
BASOPHILS # BLD AUTO: 0.06 K/UL
BASOPHILS NFR BLD AUTO: 0.7 %
BILIRUB SERPL-MCNC: 0.2 MG/DL
BUN SERPL-MCNC: 16 MG/DL
CALCIUM SERPL-MCNC: 10.1 MG/DL
CHLORIDE SERPL-SCNC: 105 MMOL/L
CO2 SERPL-SCNC: 23 MMOL/L
CREAT SERPL-MCNC: 1.03 MG/DL
EOSINOPHIL # BLD AUTO: 0.24 K/UL
EOSINOPHIL NFR BLD AUTO: 2.8 %
GLUCOSE SERPL-MCNC: 91 MG/DL
HCT VFR BLD CALC: 42.8 %
HGB BLD-MCNC: 13.6 G/DL
IMM GRANULOCYTES NFR BLD AUTO: 0.2 %
INR PPP: 1 RATIO
LYMPHOCYTES # BLD AUTO: 2.99 K/UL
LYMPHOCYTES NFR BLD AUTO: 34.4 %
MAN DIFF?: NORMAL
MCHC RBC-ENTMCNC: 26.9 PG
MCHC RBC-ENTMCNC: 31.8 GM/DL
MCV RBC AUTO: 84.6 FL
MONOCYTES # BLD AUTO: 0.45 K/UL
MONOCYTES NFR BLD AUTO: 5.2 %
NEUTROPHILS # BLD AUTO: 4.92 K/UL
NEUTROPHILS NFR BLD AUTO: 56.7 %
PLATELET # BLD AUTO: 331 K/UL
POTASSIUM SERPL-SCNC: 4.2 MMOL/L
PROT SERPL-MCNC: 7.8 G/DL
PT BLD: 11.8 SEC
RBC # BLD: 5.06 M/UL
RBC # FLD: 13.8 %
SODIUM SERPL-SCNC: 142 MMOL/L
WBC # FLD AUTO: 8.68 K/UL

## 2022-02-04 ENCOUNTER — OUTPATIENT (OUTPATIENT)
Dept: OUTPATIENT SERVICES | Facility: HOSPITAL | Age: 42
LOS: 1 days | End: 2022-02-04
Payer: COMMERCIAL

## 2022-02-04 ENCOUNTER — APPOINTMENT (OUTPATIENT)
Dept: MAMMOGRAPHY | Facility: CLINIC | Age: 42
End: 2022-02-04
Payer: COMMERCIAL

## 2022-02-04 ENCOUNTER — RESULT REVIEW (OUTPATIENT)
Age: 42
End: 2022-02-04

## 2022-02-04 ENCOUNTER — APPOINTMENT (OUTPATIENT)
Dept: ULTRASOUND IMAGING | Facility: CLINIC | Age: 42
End: 2022-02-04
Payer: COMMERCIAL

## 2022-02-04 ENCOUNTER — APPOINTMENT (OUTPATIENT)
Dept: MRI IMAGING | Facility: CLINIC | Age: 42
End: 2022-02-04
Payer: COMMERCIAL

## 2022-02-04 DIAGNOSIS — Z98.89 OTHER SPECIFIED POSTPROCEDURAL STATES: Chronic | ICD-10-CM

## 2022-02-04 DIAGNOSIS — Z96.22 MYRINGOTOMY TUBE(S) STATUS: Chronic | ICD-10-CM

## 2022-02-04 DIAGNOSIS — R93.3 ABNORMAL FINDINGS ON DIAGNOSTIC IMAGING OF OTHER PARTS OF DIGESTIVE TRACT: Chronic | ICD-10-CM

## 2022-02-04 DIAGNOSIS — Z86.711 PERSONAL HISTORY OF PULMONARY EMBOLISM: Chronic | ICD-10-CM

## 2022-02-04 DIAGNOSIS — C53.9 MALIGNANT NEOPLASM OF CERVIX UTERI, UNSPECIFIED: Chronic | ICD-10-CM

## 2022-02-04 DIAGNOSIS — R92.8 OTHER ABNORMAL AND INCONCLUSIVE FINDINGS ON DIAGNOSTIC IMAGING OF BREAST: ICD-10-CM

## 2022-02-04 PROCEDURE — 76641 ULTRASOUND BREAST COMPLETE: CPT

## 2022-02-04 PROCEDURE — 77049 MRI BREAST C-+ W/CAD BI: CPT | Mod: 26

## 2022-02-04 PROCEDURE — 76641 ULTRASOUND BREAST COMPLETE: CPT | Mod: 26,50

## 2022-02-04 PROCEDURE — 77065 DX MAMMO INCL CAD UNI: CPT | Mod: 26,RT

## 2022-02-04 PROCEDURE — A9585: CPT

## 2022-02-04 PROCEDURE — 77065 DX MAMMO INCL CAD UNI: CPT

## 2022-02-04 PROCEDURE — C8908: CPT

## 2022-02-04 PROCEDURE — C8937: CPT

## 2022-02-11 DIAGNOSIS — F41.9 ANXIETY DISORDER, UNSPECIFIED: ICD-10-CM

## 2022-02-11 DIAGNOSIS — F32.A ANXIETY DISORDER, UNSPECIFIED: ICD-10-CM

## 2022-02-11 RX ORDER — FAMOTIDINE 40 MG/1
40 TABLET, FILM COATED ORAL TWICE DAILY
Qty: 180 | Refills: 3 | Status: DISCONTINUED | COMMUNITY
Start: 2022-01-24 | End: 2022-02-11

## 2022-02-14 ENCOUNTER — RX RENEWAL (OUTPATIENT)
Age: 42
End: 2022-02-14

## 2022-02-15 ENCOUNTER — NON-APPOINTMENT (OUTPATIENT)
Age: 42
End: 2022-02-15

## 2022-02-15 ENCOUNTER — APPOINTMENT (OUTPATIENT)
Dept: CARDIOLOGY | Facility: CLINIC | Age: 42
End: 2022-02-15
Payer: COMMERCIAL

## 2022-02-15 VITALS
RESPIRATION RATE: 16 BRPM | DIASTOLIC BLOOD PRESSURE: 78 MMHG | TEMPERATURE: 98.2 F | OXYGEN SATURATION: 97 % | SYSTOLIC BLOOD PRESSURE: 120 MMHG | WEIGHT: 200 LBS | BODY MASS INDEX: 34.15 KG/M2 | HEIGHT: 64 IN | HEART RATE: 102 BPM

## 2022-02-15 VITALS — SYSTOLIC BLOOD PRESSURE: 122 MMHG | DIASTOLIC BLOOD PRESSURE: 78 MMHG

## 2022-02-15 VITALS — HEART RATE: 90 BPM

## 2022-02-15 DIAGNOSIS — R00.2 PALPITATIONS: ICD-10-CM

## 2022-02-15 DIAGNOSIS — R13.19 OTHER DYSPHAGIA: ICD-10-CM

## 2022-02-15 PROCEDURE — 93000 ELECTROCARDIOGRAM COMPLETE: CPT

## 2022-02-15 PROCEDURE — 99205 OFFICE O/P NEW HI 60 MIN: CPT

## 2022-02-15 RX ORDER — PANTOPRAZOLE 40 MG/1
40 TABLET, DELAYED RELEASE ORAL TWICE DAILY
Qty: 180 | Refills: 1 | Status: DISCONTINUED | COMMUNITY
Start: 2022-01-24 | End: 2022-02-15

## 2022-02-15 RX ORDER — LEVOCETIRIZINE DIHYDROCHLORIDE 5 MG/1
5 TABLET, FILM COATED ORAL
Refills: 0 | Status: DISCONTINUED | COMMUNITY
End: 2022-02-15

## 2022-02-15 RX ORDER — MUPIROCIN 20 MG/G
2 OINTMENT TOPICAL
Qty: 22 | Refills: 0 | Status: DISCONTINUED | COMMUNITY
Start: 2021-08-30 | End: 2022-02-15

## 2022-02-15 RX ORDER — VALACYCLOVIR 1 G/1
1 TABLET, FILM COATED ORAL
Qty: 90 | Refills: 0 | Status: DISCONTINUED | COMMUNITY
Start: 2021-09-01 | End: 2022-02-15

## 2022-02-15 NOTE — PHYSICAL EXAM
[General Appearance - Well Developed] : well developed [Normal Appearance] : normal appearance [Well Groomed] : well groomed [General Appearance - Well Nourished] : well nourished [No Deformities] : no deformities [General Appearance - In No Acute Distress] : no acute distress [Normal Conjunctiva] : the conjunctiva exhibited no abnormalities [Eyelids - No Xanthelasma] : the eyelids demonstrated no xanthelasmas [Normal Oral Mucosa] : normal oral mucosa [No Oral Pallor] : no oral pallor [No Oral Cyanosis] : no oral cyanosis [Normal Jugular Venous A Waves Present] : normal jugular venous A waves present [Normal Jugular Venous V Waves Present] : normal jugular venous V waves present [No Jugular Venous Posadas A Waves] : no jugular venous posadas A waves [Respiration, Rhythm And Depth] : normal respiratory rhythm and effort [Exaggerated Use Of Accessory Muscles For Inspiration] : no accessory muscle use [Auscultation Breath Sounds / Voice Sounds] : lungs were clear to auscultation bilaterally [Heart Rate And Rhythm] : heart rate and rhythm were normal [Heart Sounds] : normal S1 and S2 [Murmurs] : no murmurs present [Abdomen Soft] : soft [Abdomen Tenderness] : non-tender [Abdomen Mass (___ Cm)] : no abdominal mass palpated [Abnormal Walk] : normal gait [Gait - Sufficient For Exercise Testing] : the gait was sufficient for exercise testing [Cyanosis, Localized] : no localized cyanosis [Nail Clubbing] : no clubbing of the fingernails [Petechial Hemorrhages (___cm)] : no petechial hemorrhages [Skin Color & Pigmentation] : normal skin color and pigmentation [] : no rash [No Venous Stasis] : no venous stasis [Skin Lesions] : no skin lesions [No Skin Ulcers] : no skin ulcer [No Xanthoma] : no  xanthoma was observed [Oriented To Time, Place, And Person] : oriented to person, place, and time [Affect] : the affect was normal [Mood] : the mood was normal [No Anxiety] : not feeling anxious

## 2022-02-16 ENCOUNTER — OUTPATIENT (OUTPATIENT)
Dept: OUTPATIENT SERVICES | Facility: HOSPITAL | Age: 42
LOS: 1 days | End: 2022-02-16
Payer: COMMERCIAL

## 2022-02-16 DIAGNOSIS — Z96.22 MYRINGOTOMY TUBE(S) STATUS: Chronic | ICD-10-CM

## 2022-02-16 DIAGNOSIS — Z98.89 OTHER SPECIFIED POSTPROCEDURAL STATES: Chronic | ICD-10-CM

## 2022-02-16 DIAGNOSIS — Z86.711 PERSONAL HISTORY OF PULMONARY EMBOLISM: Chronic | ICD-10-CM

## 2022-02-16 DIAGNOSIS — R93.3 ABNORMAL FINDINGS ON DIAGNOSTIC IMAGING OF OTHER PARTS OF DIGESTIVE TRACT: Chronic | ICD-10-CM

## 2022-02-16 DIAGNOSIS — C53.9 MALIGNANT NEOPLASM OF CERVIX UTERI, UNSPECIFIED: Chronic | ICD-10-CM

## 2022-02-16 DIAGNOSIS — K21.9 GASTRO-ESOPHAGEAL REFLUX DISEASE WITHOUT ESOPHAGITIS: ICD-10-CM

## 2022-02-16 PROCEDURE — 92611 MOTION FLUOROSCOPY/SWALLOW: CPT

## 2022-02-16 PROCEDURE — 74230 X-RAY XM SWLNG FUNCJ C+: CPT

## 2022-02-16 PROCEDURE — 74230 X-RAY XM SWLNG FUNCJ C+: CPT | Mod: 26

## 2022-02-16 NOTE — DISCUSSION/SUMMARY
[Procedure Low Risk] : the procedure risk is low [Additional Diagnostics Recommended] : additional diagnostics recommended [As per surgery] : as per surgery [Continue] : Continue medications as currently directed [FreeTextEntry1] : This is a 38 year old woman with history cervical cancer, 2013 chemo radiation, treated, with negative PET scan, chronict left leg DVT s/p IVC filter, PE with embolectomy here for Pre-operative cardiovascular risk evaluation and management\par 1) Pre-operative cardiovascular risk evaluation and management: 2D echo Pulmonary pressurs and RV strain \par  METS ~ 4.  ct anticoagulation iwht heparin. may be interrupted during surgery. resume as soon as possibole after surgery.\par  if no significant pulmonary hypertension , may proceed for surgery without any further work up.\par 2) chest pain :  r/o pericarditis and r/o pericaridal effusion CRP and ESR. 2D echo. if unremarkable , then ok for surgery/ \par 2) PE s/p embolectomy: echo to evaluate for RV function\par 3) Left leg chronic DVT : s/p non tretriavable IVC filter. f/u with Dr. veras.  on anticoagulation with coumadin. life liong,. unprovoked DVT/PE. \par 4) Chronic LE edema: resolved compressions stockins. as per vascualr no further  venous intervention is needed. \par Will order and review ECG for the above mentioned diagnosis/condition/symptoms \par az

## 2022-02-16 NOTE — SWALLOW VFSS/MBS ASSESSMENT ADULT - DIAGNOSTIC IMPRESSIONS
No Oropharyngeal swallow function is WNL. No penetration/aspiration or pharyngeal stasis demonstrated during study.   +c/o globus sensation with intermittent throat clearing noted post swallow, however no PO demonstrated in airway or pharyngeal space.     ?Osteophyte ~C5 did not impede bolus flow.

## 2022-02-16 NOTE — SWALLOW VFSS/MBS ASSESSMENT ADULT - SLP PERTINENT HISTORY OF CURRENT PROBLEM
Pt reports difficulty swallowing, noting that she often feels like she's choking, followed by vomiting. She endorses that she often "feels full higher up," indicating neck region, and noted that she feels the need to burp or clear her throat in order to clear sensation. Difficulty is reported with both solids and liquids. Medical history is remarkable for cervical cancer, diagnosed in February, 2013 for which pt received both radiation and chemotherapy. Pt is also s/p open heart surgery in June, 2013, placement of IVC filter, GERD, hiatal hernia, and IBS

## 2022-02-16 NOTE — HISTORY OF PRESENT ILLNESS
[Preoperative Visit] : for a medical evaluation prior to surgery [Scheduled Procedure ___] : a [unfilled] [Date of Surgery ___] : on [unfilled] [Surgeon Name ___] : surgeon: [unfilled] [Good] : Good [Fever] : fever [Chest Pain] : chest pain [Nausea] : nausea [Vomiting] : vomiting [Prior Anesthesia] : Prior anesthesia [Electrocardiogram] : ~T an ECG ~C was performed [Echocardiogram] : ~T an echocardiogram ~C was performed [Metabolic Capacity ___Mets%] : The patient has a metabolic capacity of [unfilled] Mets%  [Fair] : Fair [Chills] : no chills [Fatigue] : no fatigue [Cough] : no cough [Dyspnea] : no dyspnea [Dysuria] : no dysuria [Urinary Frequency] : no urinary frequency [Diarrhea] : no diarrhea [Abdominal Pain] : no abdominal pain [Easy Bruising] : no easy bruising [Lower Extremity Swelling] : no lower extremity swelling [Poor Exercise Tolerance] : no poor exercise tolerance [Diabetes] : no diabetes [Cardiovascular Disease] : no cardiovascular disease [Pulmonary Disease] : no pulmonary disease [Anti-Platelet Agents] : no anti-platelet agents [Nicotine Dependence] : no nicotine dependence [Renal Disease] : no renal disease [GI Disease] : no gastrointestinal disease [Sleep Apnea] : no sleep apnea [Thromboembolic Problems] : no thromboembolic problems [Prev Anesthesia Reaction] : no previous anesthesia reaction [de-identified] : 40 [address] Haider  [de-identified] : patient canwalk:  bilateral avascular necrosis of both hips. can walk into the food store.  [FreeTextEntry1] :   Pre-operative cardiovascular risk evaluation and management and chest pain and palpitaitons \par \par  prior history : pulmonary embolism and left LEg chronnic DVT . iVC filter. \par \par  \par This is a 38 year old woman with istory of cervical cancer, 2/2013, s?p chemoradiation, with left leg DVT/ PE with pulmonary embolectomy  and chornic left leg DVT s/p IVC filter, non retrivable. \par Patient is here for Pre-operative cardiovascular risk evaluation and management and chest pain .\par chest pain.  Onset: days/weeks;  Type: Pressure ; Duration: days. intensity: 10/10, lasted for :  1 min   Radiation:   left arm and left soulder. Associated symptoms: + Dyspnea.   + sweating.  \par along with that she has been getting palpitaitons . severe. and some anxiety. \par cancer treatmetn has put her into early menopause and she is not sure if this is hormonal. \par This is going on since  nov 2021.     and she is pllanned for EGD and nausea and vomiting. \par In nov and december she had fever 101 and 102.  her  booster was on dec 27, 2021.  \par She also took her Prevnar. \par  palptations intermittently.  3 time a week.

## 2022-02-16 NOTE — SWALLOW VFSS/MBS ASSESSMENT ADULT - PHARYNGEAL PHASE COMMENTS
+throat clearing, c/o globus sensation noted post swallow; no pharyngeal stasis or penetration/aspiration observed. ?osteophyte ~C5 did not impede bolus flow

## 2022-02-24 ENCOUNTER — APPOINTMENT (OUTPATIENT)
Dept: CARDIOLOGY | Facility: CLINIC | Age: 42
End: 2022-02-24
Payer: COMMERCIAL

## 2022-02-24 PROCEDURE — 93306 TTE W/DOPPLER COMPLETE: CPT

## 2022-02-24 PROCEDURE — 76376 3D RENDER W/INTRP POSTPROCES: CPT

## 2022-02-24 PROCEDURE — 93356 MYOCRD STRAIN IMG SPCKL TRCK: CPT

## 2022-02-25 ENCOUNTER — NON-APPOINTMENT (OUTPATIENT)
Age: 42
End: 2022-02-25

## 2022-02-25 ENCOUNTER — OUTPATIENT (OUTPATIENT)
Dept: OUTPATIENT SERVICES | Facility: HOSPITAL | Age: 42
LOS: 1 days | End: 2022-02-25
Payer: COMMERCIAL

## 2022-02-25 ENCOUNTER — RESULT REVIEW (OUTPATIENT)
Age: 42
End: 2022-02-25

## 2022-02-25 ENCOUNTER — TRANSCRIPTION ENCOUNTER (OUTPATIENT)
Age: 42
End: 2022-02-25

## 2022-02-25 DIAGNOSIS — Z98.89 OTHER SPECIFIED POSTPROCEDURAL STATES: Chronic | ICD-10-CM

## 2022-02-25 DIAGNOSIS — C53.9 MALIGNANT NEOPLASM OF CERVIX UTERI, UNSPECIFIED: Chronic | ICD-10-CM

## 2022-02-25 DIAGNOSIS — K21.9 GASTRO-ESOPHAGEAL REFLUX DISEASE WITHOUT ESOPHAGITIS: ICD-10-CM

## 2022-02-25 DIAGNOSIS — R93.3 ABNORMAL FINDINGS ON DIAGNOSTIC IMAGING OF OTHER PARTS OF DIGESTIVE TRACT: Chronic | ICD-10-CM

## 2022-02-25 DIAGNOSIS — Z96.22 MYRINGOTOMY TUBE(S) STATUS: Chronic | ICD-10-CM

## 2022-02-25 DIAGNOSIS — Z86.711 PERSONAL HISTORY OF PULMONARY EMBOLISM: Chronic | ICD-10-CM

## 2022-02-25 PROCEDURE — 74220 X-RAY XM ESOPHAGUS 1CNTRST: CPT | Mod: 26

## 2022-02-25 PROCEDURE — 74220 X-RAY XM ESOPHAGUS 1CNTRST: CPT

## 2022-03-07 LAB
25(OH)D3 SERPL-MCNC: 24.5 NG/ML
APO B SERPL-MCNC: 85 MG/DL
APO LP(A) SERPL-MCNC: 12 NMOL/L
CHOLEST SERPL-MCNC: 203 MG/DL
CRP SERPL-MCNC: 13 MG/L
DEPRECATED D DIMER PPP IA-ACNC: <150 NG/ML DDU
ERYTHROCYTE [SEDIMENTATION RATE] IN BLOOD BY WESTERGREN METHOD: 53 MM/HR
HDLC SERPL-MCNC: 69 MG/DL
LDLC SERPL CALC-MCNC: 121 MG/DL
NONHDLC SERPL-MCNC: 134 MG/DL
TRIGL SERPL-MCNC: 64 MG/DL
TSH SERPL-ACNC: 0.99 UIU/ML

## 2022-03-08 ENCOUNTER — TRANSCRIPTION ENCOUNTER (OUTPATIENT)
Age: 42
End: 2022-03-08

## 2022-03-14 ENCOUNTER — LABORATORY RESULT (OUTPATIENT)
Age: 42
End: 2022-03-14

## 2022-03-15 RX ORDER — OMEPRAZOLE 40 MG/1
40 CAPSULE, DELAYED RELEASE ORAL
Qty: 60 | Refills: 2 | Status: DISCONTINUED | COMMUNITY
Start: 2019-11-08 | End: 2022-03-15

## 2022-03-16 ENCOUNTER — TRANSCRIPTION ENCOUNTER (OUTPATIENT)
Age: 42
End: 2022-03-16

## 2022-03-17 ENCOUNTER — RESULT REVIEW (OUTPATIENT)
Age: 42
End: 2022-03-17

## 2022-03-17 ENCOUNTER — OUTPATIENT (OUTPATIENT)
Dept: OUTPATIENT SERVICES | Facility: HOSPITAL | Age: 42
LOS: 1 days | End: 2022-03-17
Payer: COMMERCIAL

## 2022-03-17 ENCOUNTER — APPOINTMENT (OUTPATIENT)
Dept: GASTROENTEROLOGY | Facility: GI CENTER | Age: 42
End: 2022-03-17
Payer: COMMERCIAL

## 2022-03-17 DIAGNOSIS — Z98.89 OTHER SPECIFIED POSTPROCEDURAL STATES: Chronic | ICD-10-CM

## 2022-03-17 DIAGNOSIS — Z86.711 PERSONAL HISTORY OF PULMONARY EMBOLISM: Chronic | ICD-10-CM

## 2022-03-17 DIAGNOSIS — R93.3 ABNORMAL FINDINGS ON DIAGNOSTIC IMAGING OF OTHER PARTS OF DIGESTIVE TRACT: Chronic | ICD-10-CM

## 2022-03-17 DIAGNOSIS — C53.9 MALIGNANT NEOPLASM OF CERVIX UTERI, UNSPECIFIED: Chronic | ICD-10-CM

## 2022-03-17 DIAGNOSIS — K21.9 GASTRO-ESOPHAGEAL REFLUX DISEASE WITHOUT ESOPHAGITIS: ICD-10-CM

## 2022-03-17 DIAGNOSIS — Z96.22 MYRINGOTOMY TUBE(S) STATUS: Chronic | ICD-10-CM

## 2022-03-17 DIAGNOSIS — R13.19 OTHER DYSPHAGIA: ICD-10-CM

## 2022-03-17 DIAGNOSIS — R68.81 EARLY SATIETY: ICD-10-CM

## 2022-03-17 PROCEDURE — 88305 TISSUE EXAM BY PATHOLOGIST: CPT | Mod: 26

## 2022-03-17 PROCEDURE — 88342 IMHCHEM/IMCYTCHM 1ST ANTB: CPT

## 2022-03-17 PROCEDURE — 43239 EGD BIOPSY SINGLE/MULTIPLE: CPT

## 2022-03-17 PROCEDURE — 88305 TISSUE EXAM BY PATHOLOGIST: CPT

## 2022-03-17 PROCEDURE — 88342 IMHCHEM/IMCYTCHM 1ST ANTB: CPT | Mod: 26

## 2022-03-17 NOTE — ASSESSMENT
[FreeTextEntry1] : A/P\par gerd\par Today's instructions for acid reflux include avoid provocative foods. For example citrus alcohol coffee chocolate mints. Smaller meals, no eating 3 hours prior to bedtime and elevate head of the bed prior to sleep.\par \par  I discussed the risks and benefits of EGD and patient was given opportunity to ask questions. EGD to r/o García's  esophagus, PUD, mass, AVM'S. Pt is medically optimized for EGD\par

## 2022-03-22 LAB — SURGICAL PATHOLOGY STUDY: SIGNIFICANT CHANGE UP

## 2022-03-28 ENCOUNTER — RX CHANGE (OUTPATIENT)
Age: 42
End: 2022-03-28

## 2022-04-11 ENCOUNTER — TRANSCRIPTION ENCOUNTER (OUTPATIENT)
Age: 42
End: 2022-04-11

## 2022-05-31 ENCOUNTER — APPOINTMENT (OUTPATIENT)
Dept: INTERNAL MEDICINE | Facility: CLINIC | Age: 42
End: 2022-05-31
Payer: COMMERCIAL

## 2022-05-31 ENCOUNTER — NON-APPOINTMENT (OUTPATIENT)
Age: 42
End: 2022-05-31

## 2022-05-31 VITALS
OXYGEN SATURATION: 98 % | DIASTOLIC BLOOD PRESSURE: 80 MMHG | SYSTOLIC BLOOD PRESSURE: 132 MMHG | BODY MASS INDEX: 34.31 KG/M2 | WEIGHT: 201 LBS | TEMPERATURE: 97.8 F | RESPIRATION RATE: 16 BRPM | HEART RATE: 100 BPM | HEIGHT: 64 IN

## 2022-05-31 DIAGNOSIS — T85.43XA LEAKAGE OF BREAST PROSTHESIS AND IMPLANT, INITIAL ENCOUNTER: ICD-10-CM

## 2022-05-31 DIAGNOSIS — Z86.72 PERSONAL HISTORY OF THROMBOPHLEBITIS: ICD-10-CM

## 2022-05-31 DIAGNOSIS — Z01.818 ENCOUNTER FOR OTHER PREPROCEDURAL EXAMINATION: ICD-10-CM

## 2022-05-31 DIAGNOSIS — G62.9 POLYNEUROPATHY, UNSPECIFIED: ICD-10-CM

## 2022-05-31 DIAGNOSIS — R06.02 SHORTNESS OF BREATH: ICD-10-CM

## 2022-05-31 DIAGNOSIS — M25.559 PAIN IN UNSPECIFIED HIP: ICD-10-CM

## 2022-05-31 DIAGNOSIS — R93.89 ABNORMAL FINDINGS ON DIAGNOSTIC IMAGING OF OTHER SPECIFIED BODY STRUCTURES: ICD-10-CM

## 2022-05-31 DIAGNOSIS — M79.2 NEURALGIA AND NEURITIS, UNSPECIFIED: ICD-10-CM

## 2022-05-31 DIAGNOSIS — Z87.19 PERSONAL HISTORY OF OTHER DISEASES OF THE DIGESTIVE SYSTEM: ICD-10-CM

## 2022-05-31 DIAGNOSIS — Z51.11 ENCOUNTER FOR ANTINEOPLASTIC CHEMOTHERAPY: ICD-10-CM

## 2022-05-31 DIAGNOSIS — Z01.810 ENCOUNTER FOR PREPROCEDURAL CARDIOVASCULAR EXAMINATION: ICD-10-CM

## 2022-05-31 DIAGNOSIS — Z87.898 PERSONAL HISTORY OF OTHER SPECIFIED CONDITIONS: ICD-10-CM

## 2022-05-31 DIAGNOSIS — I31.3 PERICARDIAL EFFUSION (NONINFLAMMATORY): ICD-10-CM

## 2022-05-31 DIAGNOSIS — R19.5 OTHER FECAL ABNORMALITIES: ICD-10-CM

## 2022-05-31 DIAGNOSIS — R52 PAIN, UNSPECIFIED: ICD-10-CM

## 2022-05-31 LAB
ALBUMIN SERPL ELPH-MCNC: 4.7 G/DL
ALP BLD-CCNC: 91 U/L
ALT SERPL-CCNC: 22 U/L
ANION GAP SERPL CALC-SCNC: 16 MMOL/L
AST SERPL-CCNC: 16 U/L
BASOPHILS # BLD AUTO: 0.06 K/UL
BASOPHILS NFR BLD AUTO: 1.1 %
BILIRUB SERPL-MCNC: 0.4 MG/DL
BUN SERPL-MCNC: 15 MG/DL
CALCIUM SERPL-MCNC: 10.2 MG/DL
CHLORIDE SERPL-SCNC: 100 MMOL/L
CO2 SERPL-SCNC: 24 MMOL/L
CREAT SERPL-MCNC: 1.01 MG/DL
EGFR: 72 ML/MIN/1.73M2
EOSINOPHIL # BLD AUTO: 0.26 K/UL
EOSINOPHIL NFR BLD AUTO: 4.7 %
GLUCOSE SERPL-MCNC: 113 MG/DL
HCT VFR BLD CALC: 42.4 %
HGB BLD-MCNC: 13.2 G/DL
IMM GRANULOCYTES NFR BLD AUTO: 0.2 %
INR PPP: 1 RATIO
LYMPHOCYTES # BLD AUTO: 2.21 K/UL
LYMPHOCYTES NFR BLD AUTO: 39.7 %
MAN DIFF?: NORMAL
MCHC RBC-ENTMCNC: 26.9 PG
MCHC RBC-ENTMCNC: 31.1 GM/DL
MCV RBC AUTO: 86.4 FL
MONOCYTES # BLD AUTO: 0.46 K/UL
MONOCYTES NFR BLD AUTO: 8.3 %
NEUTROPHILS # BLD AUTO: 2.56 K/UL
NEUTROPHILS NFR BLD AUTO: 46 %
PLATELET # BLD AUTO: 313 K/UL
POCT-PROTHROMBIN TIME: 11.7 SECS
POTASSIUM SERPL-SCNC: 5.2 MMOL/L
PROT SERPL-MCNC: 7.5 G/DL
QUALITY CONTROL: YES
RBC # BLD: 4.91 M/UL
RBC # FLD: 14.1 %
SODIUM SERPL-SCNC: 141 MMOL/L
WBC # FLD AUTO: 5.56 K/UL

## 2022-05-31 PROCEDURE — 36415 COLL VENOUS BLD VENIPUNCTURE: CPT

## 2022-05-31 PROCEDURE — 99204 OFFICE O/P NEW MOD 45 MIN: CPT | Mod: 25

## 2022-05-31 PROCEDURE — 85610 PROTHROMBIN TIME: CPT | Mod: QW

## 2022-05-31 RX ORDER — OXYCODONE AND ACETAMINOPHEN 10; 325 MG/1; MG/1
10-325 TABLET ORAL TWICE DAILY
Refills: 0 | Status: DISCONTINUED | COMMUNITY
Start: 2017-02-07 | End: 2022-05-31

## 2022-05-31 RX ORDER — MECLIZINE HYDROCHLORIDE 25 MG/1
25 TABLET ORAL
Refills: 0 | Status: ACTIVE | COMMUNITY
Start: 2017-01-09

## 2022-05-31 RX ORDER — ALPRAZOLAM 0.5 MG/1
0.5 TABLET ORAL TWICE DAILY
Refills: 0 | Status: DISCONTINUED | COMMUNITY
Start: 2017-09-01 | End: 2022-05-31

## 2022-05-31 RX ORDER — FAMOTIDINE 40 MG/1
40 TABLET, FILM COATED ORAL
Qty: 90 | Refills: 2 | Status: DISCONTINUED | COMMUNITY
Start: 2021-07-01 | End: 2022-05-31

## 2022-05-31 NOTE — HISTORY OF PRESENT ILLNESS
[FreeTextEntry1] : establish care [de-identified] : -PMH: Anxiety, Depression, GERD w/ Hiatal Hernia, IBS w/ Fecal Incontinence, h/o Cervical CA (Dx 2/2013, s/p Chemo & RT), Chronic LLE DVT (s/p IVC Filter), h/o PE (s/p embolectomy), h/o Cardiogenic shock, h/o AVN Hip, Premature Ovarian Failure (LMP 2013)\par -SH: . 2 children. Non-smoker. Occasional EtOH use. \par \par TRICIA is a 41 year F whom is here today to establish care w/ a new PMD\par She last saw her PMD at the end of 2020. \par \par Specialists Involved:\par -OBGYN: Dr. Elizabeth\par -Onc: Dr. Roberts\par -Breast Sg: Dr. Bermeo\par -GI: Dr. Consuelo Ovalle\par -Cardio: Dr. Emory Shepard\par -VascSx: Dr. Donahue\par \par -Anxiety & Depression: Previously on Sertraline 50mg QD but due to no PMD, she has only been taking 50mg QWeekly. Now with increased anxiety and feeling down and tearful. Denies SI/HI. Does not follow w/ a therapist. \par -H/o b/l AVN Hip: 2/2 Prednisone during chemo and RT. Pain previously managed w/ PRN Oxycodone prescribed by her prior PMD which she has been off of. 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 Has not been on med because she states she ran out 6mo ago. Wears b/l LE compression socks. Follows w/ Vascular & Cardio\par \par -GERD w/ Hiatal Hernia: Remains on Famotidine 40mg HS, Pantoprazole 40mg BID & Sucralfate 1g QID. Follows w/ GI. Plan additional imaging/procedures which is pending\par -IBS w/ Fecal Incontinence, Radiation Proctitis: Remains on meds as per GI\par \par -h/o Cervical CA (Dx 2/2013, s/p Chemo & RT): Managed & Monitored as per Onc however she has been noncompliant and last saw Onc 2017. \par -Premature Ovarian Failure (LMP 2013): Not on hormone replacement. Never had DEXA

## 2022-05-31 NOTE — ASSESSMENT
[FreeTextEntry1] : -PMH: Anxiety, Depression, GERD w/ Hiatal Hernia, IBS w/ Fecal Incontinence, h/o Cervical CA (Dx 2/2013, s/p Chemo & RT), Chronic LLE DVT (s/p IVC Filter), h/o PE (s/p embolectomy), h/o Cardiogenic shock, h/o AVN Hip, Premature Ovarian Failure (LMP 2013)\par -SH: . 2 children. Non-smoker. Occasional EtOH use. \par \par TRICIA is a 41 year F whom is here today to establish care w/ a new PMD\par \par Specialists Involved:\par -OBGYN: Dr. Elizabeth\par -Onc: Dr. Roberts\par -Breast Sg: Dr. Bermeo\par -GI: Dr. Consuelo Ovalle\par -Cardio: Dr. Emory Shepard\par -VascSx: Dr. Donahue\par \par -F/u labs drawn in office today\par -Further recs pending lab results\par -Pain Mgt & Heme/Onc Consult \par -RTO Friday for INR Check \par -RTO 6weeks for f/u anxiety & depression or sooner if needed

## 2022-06-01 ENCOUNTER — NON-APPOINTMENT (OUTPATIENT)
Age: 42
End: 2022-06-01

## 2022-06-01 LAB
CHOLEST SERPL-MCNC: 233 MG/DL
CRP SERPL-MCNC: 12 MG/L
ERYTHROCYTE [SEDIMENTATION RATE] IN BLOOD BY WESTERGREN METHOD: 42 MM/HR
ESTIMATED AVERAGE GLUCOSE: 123 MG/DL
FOLATE SERPL-MCNC: 5.6 NG/ML
HBA1C MFR BLD HPLC: 5.9 %
HDLC SERPL-MCNC: 69 MG/DL
LDLC SERPL CALC-MCNC: 142 MG/DL
NONHDLC SERPL-MCNC: 165 MG/DL
TRIGL SERPL-MCNC: 113 MG/DL
VIT B12 SERPL-MCNC: 573 PG/ML

## 2022-06-03 ENCOUNTER — APPOINTMENT (OUTPATIENT)
Dept: INTERNAL MEDICINE | Facility: CLINIC | Age: 42
End: 2022-06-03
Payer: COMMERCIAL

## 2022-06-03 VITALS
DIASTOLIC BLOOD PRESSURE: 80 MMHG | WEIGHT: 201 LBS | OXYGEN SATURATION: 99 % | TEMPERATURE: 97.3 F | BODY MASS INDEX: 34.31 KG/M2 | SYSTOLIC BLOOD PRESSURE: 126 MMHG | HEART RATE: 83 BPM | HEIGHT: 64 IN

## 2022-06-03 LAB
INR PPP: 1.5 RATIO
POCT-PROTHROMBIN TIME: 18.4 SECS
QUALITY CONTROL: YES

## 2022-06-03 PROCEDURE — 99214 OFFICE O/P EST MOD 30 MIN: CPT | Mod: 25

## 2022-06-03 PROCEDURE — 85610 PROTHROMBIN TIME: CPT | Mod: QW

## 2022-06-03 NOTE — HISTORY OF PRESENT ILLNESS
[FreeTextEntry1] : iNR check, Depression [de-identified] : -PMH: Anxiety, Depression, GERD w/ Hiatal Hernia, IBS w/ Fecal Incontinence, h/o Cervical CA (Dx 2/2013, s/p Chemo & RT), Chronic LLE DVT (s/p IVC Filter), h/o PE (s/p embolectomy), h/o Cardiogenic shock, h/o AVN Hip, Premature Ovarian Failure (LMP 2013)\par -SH: . 2 children. Non-smoker. Occasional EtOH use. \par \par RTICIA is a 41 year F whom is here today for INR check, Depression\par TOday, pt reports feeling well \par Has up coming appointment w/ GynOnc. Still needs to establish visit w/ H/O\par \par -Anxiety & Depression: Back on Sertraline 50mg QD. Still w/ increased anxiety, feeling down and tearful. Denies SI/HI. Does not follow w/ a therapist. \par -H/o b/l AVN Hip: 2/2 Prednisone during chemo and RT. Pain previously managed w/ PRN Oxycodone prescribed by her prior PMD which she has been off of. 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 need to consult w/ ENT\par \par -Chronic LLE DVT (s/p IVC Filter) & h/o PE (s/p embolectomy): Previously on Warfarin 6mg QD. Has not been on med because she states she ran out 6mo ago. Wears b/l LE compression socks. Follows w/ Vascular & Cardio\par \par -GERD w/ Hiatal Hernia: Remains on Famotidine 40mg HS, Pantoprazole 40mg BID & Sucralfate 1g QID. Follows w/ GI. Plan additional imaging/procedures which is pending\par -IBS w/ Fecal Incontinence, Radiation Proctitis: Remains on meds as per GI\par \par -h/o Cervical CA (Dx 2/2013, s/p Chemo & RT): Managed & Monitored as per Onc however she has been noncompliant and last saw Onc 2017. \par -Premature Ovarian Failure (LMP 2013): Not on hormone replacement. Never had DEXA

## 2022-06-03 NOTE — ASSESSMENT
[FreeTextEntry1] : -PMH: Anxiety, Depression, GERD w/ Hiatal Hernia, IBS w/ Fecal Incontinence, h/o Cervical CA (Dx 2/2013, s/p Chemo & RT), Chronic LLE DVT (s/p IVC Filter), h/o PE (s/p embolectomy), h/o Cardiogenic shock, h/o AVN Hip, Premature Ovarian Failure (LMP 2013)\par -SH: . 2 children. Non-smoker. Occasional EtOH use. \par \par TRICIA is a 41 year F whom is here today for f/u INR check, Depression\par \par Specialists Involved:\par -GynOnc: Dr. Bowers\par -Onc: Dr. Roberts\par -Breast Sg: Dr. Bermeo\par -GI: Dr. Consuelo Ovalle\par -Cardio: Dr. Emory Shepard\par -VascSx: Dr. Donahue\par \par \par -Pain Mgt, GynOnc & Heme/Onc Consult again recommended\par -RTO 4weeks for f/u anxiety & depression or sooner if needed

## 2022-06-06 ENCOUNTER — RESULT CHARGE (OUTPATIENT)
Age: 42
End: 2022-06-06

## 2022-06-07 ENCOUNTER — APPOINTMENT (OUTPATIENT)
Dept: CARDIOLOGY | Facility: CLINIC | Age: 42
End: 2022-06-07
Payer: COMMERCIAL

## 2022-06-07 ENCOUNTER — NON-APPOINTMENT (OUTPATIENT)
Age: 42
End: 2022-06-07

## 2022-06-07 VITALS
HEART RATE: 96 BPM | TEMPERATURE: 98 F | HEIGHT: 64 IN | OXYGEN SATURATION: 98 % | RESPIRATION RATE: 14 BRPM | DIASTOLIC BLOOD PRESSURE: 82 MMHG | BODY MASS INDEX: 34.81 KG/M2 | SYSTOLIC BLOOD PRESSURE: 136 MMHG | WEIGHT: 203.9 LBS

## 2022-06-07 PROCEDURE — 99214 OFFICE O/P EST MOD 30 MIN: CPT

## 2022-06-07 PROCEDURE — 93000 ELECTROCARDIOGRAM COMPLETE: CPT

## 2022-06-07 RX ORDER — METHYLCELLULOSE 500 MG/1
500 TABLET ORAL
Refills: 0 | Status: DISCONTINUED | COMMUNITY
End: 2022-06-07

## 2022-06-07 RX ORDER — MESALAMINE 1.2 G/1
1.2 TABLET, DELAYED RELEASE ORAL DAILY
Qty: 120 | Refills: 1 | Status: DISCONTINUED | COMMUNITY
Start: 2020-05-08 | End: 2022-06-07

## 2022-06-09 ENCOUNTER — APPOINTMENT (OUTPATIENT)
Dept: GYNECOLOGIC ONCOLOGY | Facility: CLINIC | Age: 42
End: 2022-06-09
Payer: COMMERCIAL

## 2022-06-09 ENCOUNTER — RESULT CHARGE (OUTPATIENT)
Age: 42
End: 2022-06-09

## 2022-06-09 ENCOUNTER — APPOINTMENT (OUTPATIENT)
Dept: INTERNAL MEDICINE | Facility: CLINIC | Age: 42
End: 2022-06-09
Payer: COMMERCIAL

## 2022-06-09 VITALS
HEART RATE: 114 BPM | BODY MASS INDEX: 34.66 KG/M2 | DIASTOLIC BLOOD PRESSURE: 89 MMHG | WEIGHT: 203 LBS | OXYGEN SATURATION: 97 % | HEIGHT: 64 IN | SYSTOLIC BLOOD PRESSURE: 126 MMHG

## 2022-06-09 VITALS — SYSTOLIC BLOOD PRESSURE: 131 MMHG | DIASTOLIC BLOOD PRESSURE: 89 MMHG | HEIGHT: 64 IN

## 2022-06-09 PROCEDURE — 99204 OFFICE O/P NEW MOD 45 MIN: CPT

## 2022-06-09 PROCEDURE — 85610 PROTHROMBIN TIME: CPT | Mod: QW

## 2022-06-09 PROCEDURE — 99213 OFFICE O/P EST LOW 20 MIN: CPT | Mod: 25

## 2022-06-09 NOTE — HISTORY OF PRESENT ILLNESS
[FreeTextEntry1] : iNR check, Depression [de-identified] : -PMH: Anxiety, Depression, GERD w/ Hiatal Hernia, IBS w/ Fecal Incontinence, h/o Cervical CA (Dx 2/2013, s/p Chemo & RT), Chronic LLE DVT (s/p IVC Filter), h/o PE (s/p embolectomy), h/o Cardiogenic shock, h/o AVN Hip, Premature Ovarian Failure (LMP 2013)\par -SH: . 2 children. Non-smoker. Occasional EtOH use. \par \par TRICIA is a 41 year F whom is here today for INR check, Depression\par TOday, pt reports feeling well \par Has up coming appointment w/ GynOnc. Still needs to establish visit w/ H/O\par \par -Anxiety & Depression: Remains on Sertraline 50mg QD. Denies SI/HI. Does not follow w/ a therapist. Overall improved.\par -H/o b/l AVN Hip: 2/2 Prednisone during chemo and RT. Pain previously managed w/ PRN Oxycodone prescribed by her prior PMD which she has been off of. 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 need to consult w/ ENT\par \par -Chronic LLE DVT (s/p IVC Filter) & h/o PE (s/p embolectomy): Previously on Warfarin 6mg QD. Has not been on med because she states she ran out 6mo ago. Wears b/l LE compression socks. Follows w/ Vascular & Cardio\par \par -GERD w/ Hiatal Hernia: Remains on Famotidine 40mg HS, Pantoprazole 40mg BID & Sucralfate 1g QID. Follows w/ GI. Plan additional imaging/procedures which is pending\par -IBS w/ Fecal Incontinence, Radiation Proctitis: Remains on meds as per GI\par \par -h/o Cervical CA (Dx 2/2013, s/p Chemo & RT): Managed & Monitored as per Onc however she has been noncompliant and last saw Onc 2017. \par -Premature Ovarian Failure (LMP 2013): Not on hormone replacement. Never had DEXA

## 2022-06-09 NOTE — ASSESSMENT
[FreeTextEntry1] : -PMH: Anxiety, Depression, GERD w/ Hiatal Hernia, IBS w/ Fecal Incontinence, h/o Cervical CA (Dx 2/2013, s/p Chemo & RT), Chronic LLE DVT (s/p IVC Filter), h/o PE (s/p embolectomy), h/o Cardiogenic shock, h/o AVN Hip, Premature Ovarian Failure (LMP 2013)\par -SH: . 2 children. Non-smoker. Occasional EtOH use. \par \par TRICIA is a 41 year F whom is here today for f/u INR check, Depression\par \par Specialists Involved:\par -GynOnc: Dr. Vero Bowers\par -Onc: Dr. Néstor Roberts\par -Breast Sg: Dr. Lynda Bermeo\par -GI: Dr. Consuelo Ovalle\par -Cardio: Dr. Emory Shepard\par -VascSx: Dr. Donahue\par \par -Pain Mgt, GynOnc & Heme/Onc Consult again recommended\par -Continue routine f/u w/ Cardio (h/o PE, Chronic DVT)\par -RTO 3weeks for f/u anxiety & depression or sooner if needed

## 2022-06-10 LAB — HPV HIGH+LOW RISK DNA PNL CVX: NOT DETECTED

## 2022-06-13 ENCOUNTER — APPOINTMENT (OUTPATIENT)
Dept: INTERNAL MEDICINE | Facility: CLINIC | Age: 42
End: 2022-06-13
Payer: COMMERCIAL

## 2022-06-13 ENCOUNTER — NON-APPOINTMENT (OUTPATIENT)
Age: 42
End: 2022-06-13

## 2022-06-13 ENCOUNTER — RESULT CHARGE (OUTPATIENT)
Age: 42
End: 2022-06-13

## 2022-06-13 PROCEDURE — 85610 PROTHROMBIN TIME: CPT | Mod: QW

## 2022-06-14 LAB — CYTOLOGY CVX/VAG DOC THIN PREP: ABNORMAL

## 2022-06-15 ENCOUNTER — OUTPATIENT (OUTPATIENT)
Dept: OUTPATIENT SERVICES | Facility: HOSPITAL | Age: 42
LOS: 1 days | Discharge: HOME | End: 2022-06-15

## 2022-06-15 ENCOUNTER — APPOINTMENT (OUTPATIENT)
Dept: HEMATOLOGY ONCOLOGY | Facility: CLINIC | Age: 42
End: 2022-06-15
Payer: COMMERCIAL

## 2022-06-15 ENCOUNTER — NON-APPOINTMENT (OUTPATIENT)
Age: 42
End: 2022-06-15

## 2022-06-15 ENCOUNTER — APPOINTMENT (OUTPATIENT)
Dept: INTERNAL MEDICINE | Facility: CLINIC | Age: 42
End: 2022-06-15
Payer: COMMERCIAL

## 2022-06-15 VITALS
BODY MASS INDEX: 34.66 KG/M2 | HEART RATE: 103 BPM | WEIGHT: 203 LBS | OXYGEN SATURATION: 99 % | DIASTOLIC BLOOD PRESSURE: 83 MMHG | SYSTOLIC BLOOD PRESSURE: 129 MMHG | HEIGHT: 64 IN

## 2022-06-15 DIAGNOSIS — Z96.22 MYRINGOTOMY TUBE(S) STATUS: Chronic | ICD-10-CM

## 2022-06-15 DIAGNOSIS — Z86.711 PERSONAL HISTORY OF PULMONARY EMBOLISM: Chronic | ICD-10-CM

## 2022-06-15 DIAGNOSIS — Z98.89 OTHER SPECIFIED POSTPROCEDURAL STATES: Chronic | ICD-10-CM

## 2022-06-15 DIAGNOSIS — R93.3 ABNORMAL FINDINGS ON DIAGNOSTIC IMAGING OF OTHER PARTS OF DIGESTIVE TRACT: Chronic | ICD-10-CM

## 2022-06-15 DIAGNOSIS — C53.9 MALIGNANT NEOPLASM OF CERVIX UTERI, UNSPECIFIED: ICD-10-CM

## 2022-06-15 DIAGNOSIS — C53.9 MALIGNANT NEOPLASM OF CERVIX UTERI, UNSPECIFIED: Chronic | ICD-10-CM

## 2022-06-15 PROCEDURE — 99215 OFFICE O/P EST HI 40 MIN: CPT

## 2022-06-15 PROCEDURE — 85610 PROTHROMBIN TIME: CPT | Mod: QW

## 2022-06-15 RX ORDER — ONDANSETRON 4 MG/1
4 TABLET, ORALLY DISINTEGRATING ORAL EVERY 6 HOURS
Refills: 0 | Status: DISCONTINUED | COMMUNITY
Start: 2019-01-22 | End: 2022-06-15

## 2022-06-15 NOTE — ASSESSMENT
[FreeTextEntry1] : 41 year old female with\par \par 1) history of cervical cancer s/p definitive chemoradiation in June 2013.  She is 9 years out from treatment.  S\par PET CT on 10/23/17 - BRANDI.\par Seen by Dr. Bowers for internal exam this month\par Follow up GYN cytology\par For surveillance, need yearly GYN exam\par No role for routine imaging, it will be symptom driven\par \par 2)H/o DVT + PE in 2013, s/p thrombectomy.\par -given life threatening event, plan is for long term AC\par -did not have PCP for 1 year and did not have INR checked and recently established care.\par -currently INR is being managed by Dr. Sampson, she is requiring higher than her previous dose of coumadin and is on Lovenox bridging. Higher doses do not necessarily confer a problem, as long as therapeutic INR is achieved. Many factors play a role including medications, diet, etc.\par -I did discuss consider switch to a DOAC for  ease of treatment, both Xarelto or eliquis would be fine. She previously had rectal bleeding on Xarelto but it was soon after her chemoradiation in 2013 when mucosa can be more friable/inflammed and hence higher risk of bleeding. She will think over this but wishes to remain on Coumadin at this time.\par \par 5/31/22 CBC was normal. Hgb 13.2\par INR today was 1.7\par \par RTO 1 year.

## 2022-06-15 NOTE — HISTORY OF PRESENT ILLNESS
[de-identified] : Ms. 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  She has a history of abnormal pap smear in 2008, positive HPV. \par In February 2013, she had a LEEP procedure which showed invasive squamous cell carcinoma of the cervix in all 4 quadrants of endocervix.  She subsequently had a PET CT which showed positive bilateral common iliac nodes and a node at aortic bifurcation and external iliac nodes. She subsequently had exploratory lap on 3/25/13, and was ultimately found to have metastatic disease to the left internal iliac and right common iliac node. She subsequently received definitive chemoradiation with weekly cisplatin completed in June 2013.  She then received 2800 cGY HDR intracavitary brachytherapy completed in July 2013.  Her course was complicated by DVT and large central pulmonary embolism, cardiogenic shock, and subsequently had embolectomy on 6/18/13.  She remains on warfarin.  \par \par Last surveillance PET CT on 2/19/2016 showed BRANDI.  She last had a colposcopy on 7/18/16 by Dr. Vale Gavin which showed HGSIL.  She was told to use Aldara which she last used in Summer 2016. \par \par She's currently recovering from a bilateral ear infection which she has had for 3 weeks. She was seen at urgent care gave her Z-hans.   She is not feeling better after antibiotics. \par She is seeing PCP tomorrow.\par \par She has not seen a physician at [facility] for 1 year. She reports her weight fluctuates. She reports a poor appetite. She has chronic pelvic pain at rest. Denies any vaginal discharge. No vaginal bleeding.  She occasionally has hematuria.   [de-identified] : Returns for follow up. Last office visit was in 11/2017.\par Recalls did not have PCP for the past year up until 5/2022, was self managing Coumadin at home without INR check\par Reports PCP Dr. Sampson has been monitoring INR since 5/2022, notes INR has been low since 5/2022 while on Coumadin. Recalls in 2014 INR was low as well when Coumadin was initiated \par Started additional Lovenox 80mg BID since 6/13/22 by PCP \par Currently on 6mg Coumadin daily, PCP Dr. Sampson is making dose adjustments \par Reports c-reactive protein is also elevated in 5/2022\par Has history of  DVT and large central pulmonary embolism and subsequently had embolectomy on 6/18/13. Recalls being discharge on Xarelto then transitioned to Coumadin,  f/u with [facility] 3/12/2015 started Arixtra injection, in 9/2015 stopped Arixtra and transitioned back to Coumadin \par Evaluated by GYN/ONC Dr. Bowers on 6/9/22, underwent pap smear \par Had shingles on face near eye in 8/2021 \par No weight loss\par No dyspnea. No chest pain \par No change in the past year \par Has difficulty swallowing solid foods due to globus feeling and GERD, f/u with GI Dr. Ovalle, pending EGD

## 2022-06-17 ENCOUNTER — RESULT CHARGE (OUTPATIENT)
Age: 42
End: 2022-06-17

## 2022-06-17 ENCOUNTER — APPOINTMENT (OUTPATIENT)
Dept: INTERNAL MEDICINE | Facility: CLINIC | Age: 42
End: 2022-06-17
Payer: COMMERCIAL

## 2022-06-17 PROCEDURE — 85610 PROTHROMBIN TIME: CPT | Mod: QW

## 2022-06-22 ENCOUNTER — APPOINTMENT (OUTPATIENT)
Dept: INTERNAL MEDICINE | Facility: CLINIC | Age: 42
End: 2022-06-22

## 2022-06-22 ENCOUNTER — NON-APPOINTMENT (OUTPATIENT)
Age: 42
End: 2022-06-22

## 2022-06-22 DIAGNOSIS — B37.2 CANDIDIASIS OF SKIN AND NAIL: ICD-10-CM

## 2022-06-22 RX ORDER — ESTRADIOL/NORETHINDRONE ACETATE TRANSDERMAL SYSTEM .05; .14 MG/D; MG/D
0.05-0.14 PATCH, EXTENDED RELEASE TRANSDERMAL
Qty: 3 | Refills: 3 | Status: ACTIVE | COMMUNITY
Start: 2022-06-22 | End: 1900-01-01

## 2022-06-24 ENCOUNTER — RX CHANGE (OUTPATIENT)
Age: 42
End: 2022-06-24

## 2022-06-24 ENCOUNTER — APPOINTMENT (OUTPATIENT)
Dept: INTERNAL MEDICINE | Facility: CLINIC | Age: 42
End: 2022-06-24
Payer: COMMERCIAL

## 2022-06-24 ENCOUNTER — NON-APPOINTMENT (OUTPATIENT)
Age: 42
End: 2022-06-24

## 2022-06-24 VITALS
RESPIRATION RATE: 16 BRPM | DIASTOLIC BLOOD PRESSURE: 66 MMHG | HEART RATE: 123 BPM | WEIGHT: 199 LBS | SYSTOLIC BLOOD PRESSURE: 118 MMHG | TEMPERATURE: 96.3 F | OXYGEN SATURATION: 98 % | HEIGHT: 64 IN | BODY MASS INDEX: 33.97 KG/M2

## 2022-06-24 PROCEDURE — 85610 PROTHROMBIN TIME: CPT | Mod: QW

## 2022-06-24 PROCEDURE — 99213 OFFICE O/P EST LOW 20 MIN: CPT | Mod: 25

## 2022-06-24 RX ORDER — WARFARIN 2 MG/1
2 TABLET ORAL DAILY
Qty: 270 | Refills: 0 | Status: DISCONTINUED | COMMUNITY
Start: 2017-01-09 | End: 2022-06-24

## 2022-06-24 NOTE — HISTORY OF PRESENT ILLNESS
[FreeTextEntry1] : iNR check, Depression [de-identified] : -PMH: Anxiety, Depression, GERD w/ Hiatal Hernia, IBS w/ Fecal Incontinence, h/o Cervical CA (Dx 2/2013, s/p Chemo & RT), Chronic LLE DVT (s/p IVC Filter), h/o PE (s/p embolectomy), h/o Cardiogenic shock, h/o AVN Hip, Premature Ovarian Failure (LMP 2013)\par -SH: . 2 children. Non-smoker. Occasional EtOH use. \par \par TRICIA is a 41 year F whom is here today for INR check\par \par INR Today: 1.2\par \par -Chronic LLE DVT (s/p IVC Filter) & h/o PE (s/p embolectomy): Previously on Warfarin 6mg QD. Has been off med because she states she ran out 6mo ago. Wears b/l LE compression socks. Follows w/ Vascular & Cardio\par \par -Anxiety & Depression: Remains on Sertraline 50mg QD. Denies SI/HI. Does not follow w/ a therapist. Overall improved.\par -H/o b/l AVN Hip: 2/2 Prednisone during chemo and RT. Pain previously managed w/ PRN Oxycodone prescribed by her prior PMD which she has been off of. 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 need to consult w/ ENT\par \par -GERD w/ Hiatal Hernia: Remains on Famotidine 40mg HS, Pantoprazole 40mg BID & Sucralfate 1g QID. Follows w/ GI. Plan additional imaging/procedures which is pending\par -IBS w/ Fecal Incontinence, Radiation Proctitis: Remains on meds as per GI\par \par -h/o Cervical CA (Dx 2/2013, s/p Chemo & RT): Managed & Monitored as per Onc however she has been noncompliant and last saw Onc 2017. \par -Premature Ovarian Failure (LMP 2013): Not on hormone replacement. Never had DEXA

## 2022-06-24 NOTE — ASSESSMENT
[FreeTextEntry1] : -PMH: Anxiety, Depression, GERD w/ Hiatal Hernia, IBS w/ Fecal Incontinence, h/o Cervical CA (Dx 2/2013, s/p Chemo & RT), Chronic LLE DVT (s/p IVC Filter), h/o PE (s/p embolectomy), h/o Cardiogenic shock, h/o AVN Hip, Premature Ovarian Failure (LMP 2013)\par -SH: . 2 children. Non-smoker. Occasional EtOH use. \par \par TRICIA is a 41 year F whom is here today for f/u INR check\par \par Specialists Involved:\par -GynOnc: Dr. Vero Bowers\par -Onc: Dr. Néstor Roberts\par -Breast Sg: Dr. Lynda Bermeo\par -GI: Dr. Consuelo Ovalle\par -Cardio: Dr. Emory Shepard\par -VascSx: Dr. Donahue\par \par -Pain Mgt, GynOnc & Heme/Onc Consult again recommended\par -Continue routine f/u w/ Cardio (h/o PE, Chronic DVT)\par -RTO 3weeks for f/u anxiety & depression or sooner if needed

## 2022-07-05 ENCOUNTER — APPOINTMENT (OUTPATIENT)
Dept: CARDIOLOGY | Facility: CLINIC | Age: 42
End: 2022-07-05

## 2022-07-06 ENCOUNTER — APPOINTMENT (OUTPATIENT)
Dept: INTERNAL MEDICINE | Facility: CLINIC | Age: 42
End: 2022-07-06

## 2022-07-06 ENCOUNTER — NON-APPOINTMENT (OUTPATIENT)
Age: 42
End: 2022-07-06

## 2022-07-06 DIAGNOSIS — R60.0 LOCALIZED EDEMA: ICD-10-CM

## 2022-07-06 DIAGNOSIS — K44.9 GASTRO-ESOPHAGEAL REFLUX DISEASE W/OUT ESOPHAGITIS: ICD-10-CM

## 2022-07-06 DIAGNOSIS — K62.7 RADIATION PROCTITIS: ICD-10-CM

## 2022-07-06 DIAGNOSIS — R07.89 OTHER CHEST PAIN: ICD-10-CM

## 2022-07-06 DIAGNOSIS — K21.9 GASTRO-ESOPHAGEAL REFLUX DISEASE W/OUT ESOPHAGITIS: ICD-10-CM

## 2022-07-06 DIAGNOSIS — R93.5 ABNORMAL FINDINGS ON DIAGNOSTIC IMAGING OF OTHER ABDOMINAL REGIONS, INCLUDING RETROPERITONEUM: ICD-10-CM

## 2022-07-06 DIAGNOSIS — Z87.09 PERSONAL HISTORY OF OTHER DISEASES OF THE RESPIRATORY SYSTEM: ICD-10-CM

## 2022-07-06 RX ORDER — NYSTATIN AND TRIAMCINOLONE ACETONIDE 100000; 1 MG/G; MG/G
100000-0.1 CREAM TOPICAL TWICE DAILY
Qty: 30 | Refills: 0 | Status: DISCONTINUED | COMMUNITY
Start: 2022-06-09 | End: 2022-07-06

## 2022-07-06 RX ORDER — ENOXAPARIN SODIUM 80 MG/.8ML
80 INJECTION, SOLUTION SUBCUTANEOUS
Qty: 1 | Refills: 0 | Status: DISCONTINUED | COMMUNITY
Start: 2022-06-13 | End: 2022-07-06

## 2022-07-19 ENCOUNTER — RX RENEWAL (OUTPATIENT)
Age: 42
End: 2022-07-19

## 2022-07-21 ENCOUNTER — APPOINTMENT (OUTPATIENT)
Dept: CARDIOLOGY | Facility: CLINIC | Age: 42
End: 2022-07-21

## 2022-08-11 ENCOUNTER — APPOINTMENT (OUTPATIENT)
Dept: INTERNAL MEDICINE | Facility: CLINIC | Age: 42
End: 2022-08-11

## 2022-08-11 VITALS
OXYGEN SATURATION: 98 % | DIASTOLIC BLOOD PRESSURE: 90 MMHG | HEART RATE: 90 BPM | WEIGHT: 204 LBS | TEMPERATURE: 97.1 F | HEIGHT: 64 IN | BODY MASS INDEX: 34.83 KG/M2 | SYSTOLIC BLOOD PRESSURE: 126 MMHG

## 2022-08-11 DIAGNOSIS — R11.2 NAUSEA WITH VOMITING, UNSPECIFIED: ICD-10-CM

## 2022-08-11 DIAGNOSIS — E53.8 DEFICIENCY OF OTHER SPECIFIED B GROUP VITAMINS: ICD-10-CM

## 2022-08-11 PROCEDURE — 99214 OFFICE O/P EST MOD 30 MIN: CPT

## 2022-08-11 RX ORDER — FAMOTIDINE 40 MG/1
40 TABLET, FILM COATED ORAL
Qty: 30 | Refills: 5 | Status: ACTIVE | COMMUNITY
Start: 2020-12-14

## 2022-08-11 NOTE — HISTORY OF PRESENT ILLNESS
[FreeTextEntry1] : Anxiety, Depression, Post-COVID [de-identified] : -PMH: Anxiety, Depression, GERD w/ Hiatal Hernia, IBS w/ Fecal Incontinence, h/o Cervical CA (Dx 2/2013, s/p Chemo & RT), Chronic LLE DVT (s/p IVC Filter), h/o PE (s/p embolectomy), h/o Cardiogenic shock, h/o AVN Hip, Premature Ovarian Failure (LMP 2013)\par -SH: . 2 children. Non-smoker. Occasional EtOH use. \par \par TRICIA is a 41 year F whom is here today for f/u Anxiety, Depression\par \par -Chronic LLE DVT (s/p IVC Filter) & h/o PE (s/p embolectomy): Now off Warfarin and on Xarelto 20mg QD per Heme/Onc. Compliant w/ b/l LE compression socks. Follows w/ Heme, Vascular & Cardio\par \par -Anxiety & Depression: Remains on Sertraline 50mg QD. Denies SI/HI. Does not follow w/ a therapist. Overall improved.\par \par -H/o b/l AVN Hip: 2/2 Prednisone during chemo and RT. Pain previously managed w/ PRN Oxycodone prescribed by her prior PMD which she has been off of. 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 need to consult w/ ENT\par \par -GERD w/ Hiatal Hernia: Remains on Famotidine 40mg HS, Pantoprazole 40mg BID & Sucralfate 1g QID. Follows w/ GI. Plan additional imaging/procedures which is pending\par -IBS w/ Fecal Incontinence, Radiation Proctitis: Remains on meds as per GI\par \par -h/o Cervical CA (Dx 2/2013, s/p Chemo & RT): Managed & Monitored as per Onc however she has been noncompliant and last saw Onc 2017. \par -Premature Ovarian Failure (LMP 2013): Not on hormone replacement. Never had DEXA

## 2022-08-11 NOTE — ASSESSMENT
[FreeTextEntry1] : -PMH: Anxiety, Depression, GERD w/ Hiatal Hernia, IBS w/ Fecal Incontinence, h/o Cervical CA (Dx 2/2013, s/p Chemo & RT), Chronic LLE DVT (s/p IVC Filter), h/o PE (s/p embolectomy), h/o Cardiogenic shock, h/o AVN Hip, Premature Ovarian Failure (LMP 2013)\par -SH: . 2 children. Non-smoker. Occasional EtOH use. \par \par TRICIA is a 41 year F whom is here today for f/u Anxiety, Depression\par \par Specialists Involved:\par -GynOnc: Dr. Vero Bowers\par -Heme/Onc: Dr. Néstor Roberts\par -Breast Sg: Dr. Lynda Bermeo\par -GI: Dr. Consuelo Ovalle\par -Cardio: Dr. Emory Shepard\par -VascSx: Dr. Brock Donahue\par \par -Pain Mgt Consult again recommended\par -Continue f/u w/ Gyn/Onc & Heme/Onc (Cervical CA)\par -Continue routine f/u w/ Cardio (h/o PE, Chronic DVT)\par -RTO 6mo for routine f/u or sooner if needed

## 2022-09-28 ENCOUNTER — NON-APPOINTMENT (OUTPATIENT)
Age: 42
End: 2022-09-28

## 2022-10-05 ENCOUNTER — APPOINTMENT (OUTPATIENT)
Dept: PHYSICAL MEDICINE AND REHAB | Facility: CLINIC | Age: 42
End: 2022-10-05

## 2022-10-05 ENCOUNTER — RESULT REVIEW (OUTPATIENT)
Age: 42
End: 2022-10-05

## 2022-10-05 DIAGNOSIS — M54.2 CERVICALGIA: ICD-10-CM

## 2022-10-05 DIAGNOSIS — M54.50 LOW BACK PAIN, UNSPECIFIED: ICD-10-CM

## 2022-10-05 DIAGNOSIS — M79.2 NEURALGIA AND NEURITIS, UNSPECIFIED: ICD-10-CM

## 2022-10-05 DIAGNOSIS — M54.6 PAIN IN THORACIC SPINE: ICD-10-CM

## 2022-10-05 DIAGNOSIS — R10.2 PELVIC AND PERINEAL PAIN: ICD-10-CM

## 2022-10-05 PROCEDURE — 99204 OFFICE O/P NEW MOD 45 MIN: CPT

## 2022-10-05 RX ORDER — GABAPENTIN 300 MG/1
300 CAPSULE ORAL 3 TIMES DAILY
Qty: 90 | Refills: 1 | Status: ACTIVE | COMMUNITY
Start: 2022-10-05 | End: 1900-01-01
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
## 2022-10-05 NOTE — PHYSICAL EXAM
[FreeTextEntry1] : Gen: Patient is A&O x 3, NAD\par HEENT: EOMI, hearing grossly normal\par Resp: regular, non - labored\par CV: pulses regular\par Skin: no rashes, erythema\par Lymph: no clubbing, cyanosis, edema, or palpable lymphadenopathy\par Inspection: no edema \par ROM: Mild pain with lumbar flexion, extension and cervical rotation \par Palpation: Diffuse TTP in cervical paraspinals and periscapular muscles \par Sensation: intact to light touch\par Reflexes: 1+ and symmetric throughout\par Strength: 5/5 throughout\par Special tests: -Spurling sign, -Ghotra's sign, -Straight leg raise \par Gait: antalgic\par \par

## 2022-10-05 NOTE — HISTORY OF PRESENT ILLNESS
[FreeTextEntry1] : Ms. Lewis is a 41 year old female with history of cervical cancer s/p definitive chemoradiation in June 2013.\par Also, H/o DVT + PE in 2013, s/p thrombectomy.  She is currently on anti-coagulation.  She reports that she has had chronic pain symptoms since her cancer treatment.  She reports pain and discomfort in her pelvic region for which she is followed up with gynecologic oncology.  She denies any overt radiation of pain down her extremities or any overt weakness but does feel generalized weakness and fatigue.  She reports that she has pain that starts in her neck and radiates down her entire spine including significant muscle tightness.  In the past she had been prescribed opiate medications which she thought were helpful but reports she has not been on these medications for over 2 years.  She also reports a history of anxiety for which she previously had used medication such as Xanax but has also not been on this medication recently.  She denies any new bowel bladder dysfunction although she does have chronic dysfunction from her previous treatment.  She also has a history of AVN but has not followed up with orthopedics.\par

## 2022-10-05 NOTE — ASSESSMENT
[FreeTextEntry1] : 41 year old female presenting for evaluation.\par \par #Back pain/Neck pain:\par -Previous MRI cervical/thoracic/lumbar spine reviewed\par -Pain appears mostly myofascial in nature without any radicular or other neurologic deficits \par -Discussed re-obtaining MRI, but she has difficulty with MRIs\par -Obtain x-ray cervical, thoracic and lumbar spine\par -Increase gabapentin to 300mg TID, she denies any kidney dysfunction \par \par #Pelvic pain:\par -Previous evaluation by GYN-onc\par -Discussed risks and benefits of pelvic floor therapy to see if this would assist with symptoms\par -She would like to go for evaluation\par \par #Anxiety:\par -Referral to psychiatry\par \par #History of AVN:\par -Recommend follow up with orthopedics for further evaluation.  \par \par Follow up in 4-6 weeks.

## 2022-10-06 ENCOUNTER — TRANSCRIPTION ENCOUNTER (OUTPATIENT)
Age: 42
End: 2022-10-06

## 2022-10-07 ENCOUNTER — OUTPATIENT (OUTPATIENT)
Dept: OUTPATIENT SERVICES | Facility: HOSPITAL | Age: 42
LOS: 1 days | End: 2022-10-07
Payer: COMMERCIAL

## 2022-10-07 ENCOUNTER — APPOINTMENT (OUTPATIENT)
Dept: RADIOLOGY | Facility: CLINIC | Age: 42
End: 2022-10-07

## 2022-10-07 ENCOUNTER — RESULT REVIEW (OUTPATIENT)
Age: 42
End: 2022-10-07

## 2022-10-07 DIAGNOSIS — Z98.89 OTHER SPECIFIED POSTPROCEDURAL STATES: Chronic | ICD-10-CM

## 2022-10-07 DIAGNOSIS — R93.3 ABNORMAL FINDINGS ON DIAGNOSTIC IMAGING OF OTHER PARTS OF DIGESTIVE TRACT: Chronic | ICD-10-CM

## 2022-10-07 DIAGNOSIS — C53.9 MALIGNANT NEOPLASM OF CERVIX UTERI, UNSPECIFIED: Chronic | ICD-10-CM

## 2022-10-07 DIAGNOSIS — Z96.22 MYRINGOTOMY TUBE(S) STATUS: Chronic | ICD-10-CM

## 2022-10-07 DIAGNOSIS — Z86.711 PERSONAL HISTORY OF PULMONARY EMBOLISM: Chronic | ICD-10-CM

## 2022-10-07 DIAGNOSIS — Z00.8 ENCOUNTER FOR OTHER GENERAL EXAMINATION: ICD-10-CM

## 2022-10-07 PROCEDURE — 72110 X-RAY EXAM L-2 SPINE 4/>VWS: CPT | Mod: 26

## 2022-10-07 PROCEDURE — 72050 X-RAY EXAM NECK SPINE 4/5VWS: CPT | Mod: 26

## 2022-10-07 PROCEDURE — 72070 X-RAY EXAM THORAC SPINE 2VWS: CPT | Mod: 26

## 2022-10-07 PROCEDURE — 72070 X-RAY EXAM THORAC SPINE 2VWS: CPT

## 2022-10-07 PROCEDURE — 72110 X-RAY EXAM L-2 SPINE 4/>VWS: CPT

## 2022-10-07 PROCEDURE — 72050 X-RAY EXAM NECK SPINE 4/5VWS: CPT

## 2022-10-11 ENCOUNTER — NON-APPOINTMENT (OUTPATIENT)
Age: 42
End: 2022-10-11

## 2022-10-13 ENCOUNTER — NON-APPOINTMENT (OUTPATIENT)
Age: 42
End: 2022-10-13

## 2022-10-21 ENCOUNTER — RX RENEWAL (OUTPATIENT)
Age: 42
End: 2022-10-21

## 2022-11-16 ENCOUNTER — APPOINTMENT (OUTPATIENT)
Dept: PHYSICAL MEDICINE AND REHAB | Facility: CLINIC | Age: 42
End: 2022-11-16

## 2022-12-06 ENCOUNTER — APPOINTMENT (OUTPATIENT)
Dept: CARDIOLOGY | Facility: CLINIC | Age: 42
End: 2022-12-06

## 2023-01-04 ENCOUNTER — TRANSCRIPTION ENCOUNTER (OUTPATIENT)
Age: 43
End: 2023-01-04

## 2023-02-28 ENCOUNTER — APPOINTMENT (OUTPATIENT)
Dept: SURGERY | Facility: CLINIC | Age: 43
End: 2023-02-28

## 2023-03-01 ENCOUNTER — APPOINTMENT (OUTPATIENT)
Dept: INTERNAL MEDICINE | Facility: CLINIC | Age: 43
End: 2023-03-01
Payer: COMMERCIAL

## 2023-03-01 VITALS
TEMPERATURE: 97.4 F | BODY MASS INDEX: 34.15 KG/M2 | WEIGHT: 200 LBS | SYSTOLIC BLOOD PRESSURE: 130 MMHG | RESPIRATION RATE: 16 BRPM | OXYGEN SATURATION: 98 % | HEIGHT: 64 IN | DIASTOLIC BLOOD PRESSURE: 90 MMHG | HEART RATE: 118 BPM

## 2023-03-01 DIAGNOSIS — E28.319 ASYMPTOMATIC PREMATURE MENOPAUSE: ICD-10-CM

## 2023-03-01 DIAGNOSIS — M87.059 IDIOPATHIC ASEPTIC NECROSIS OF UNSPECIFIED FEMUR: ICD-10-CM

## 2023-03-01 PROCEDURE — 99214 OFFICE O/P EST MOD 30 MIN: CPT

## 2023-03-01 RX ORDER — GABAPENTIN 100 MG/1
100 CAPSULE ORAL
Qty: 180 | Refills: 1 | Status: DISCONTINUED | COMMUNITY
Start: 2017-03-25 | End: 2023-03-01

## 2023-03-01 NOTE — HISTORY OF PRESENT ILLNESS
[FreeTextEntry1] : Anxiety, Depression, POF [de-identified] : -PMH: Anxiety, Depression, GERD w/ Hiatal Hernia, IBS w/ Fecal Incontinence, h/o Cervical CA (Dx 2/2013, s/p Chemo & RT), Chronic LLE DVT (s/p IVC Filter), h/o PE (s/p embolectomy), h/o Cardiogenic shock, h/o AVN Hip, Premature Ovarian Failure (LMP 2013)\par -SH: . 2 children. Non-smoker. Occasional EtOH use. 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 Compliant w/ b/l LE compression socks. Follows w/ Heme, Vascular & Cardio\par \par -Anxiety & Depression: Remains on Sertraline 50mg QD. Denies SI/HI. Does not follow w/ a therapist. Overall improved.\par \par -H/o b/l AVN Hip: 2/2 Prednisone during chemo and RT. Pain previously managed w/ PRN Oxycodone prescribed by her prior PMD which she has been off of. 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 need to consult w/ ENT\par \par -GERD w/ Hiatal Hernia: Remains on Famotidine 40mg HS, Pantoprazole 40mg BID & Sucralfate 1g QID. Follows w/ GI. Plan additional imaging/procedures which is pending\par -IBS w/ Fecal Incontinence, Radiation Proctitis: Remains on meds as per GI\par \par -h/o Cervical CA (Dx 2/2013, s/p Chemo & RT): Managed & Monitored as per Onc however she has been noncompliant and last saw Onc 2017. \par -Premature Ovarian Failure (LMP 2013): Now on CombiPatch. Last DEXA 2019.

## 2023-03-01 NOTE — ASSESSMENT
[FreeTextEntry1] : -PMH: Anxiety, Depression, GERD w/ Hiatal Hernia, IBS w/ Fecal Incontinence, h/o Cervical CA (Dx 2/2013, s/p Chemo & RT), Chronic LLE DVT (s/p IVC Filter), h/o PE (s/p embolectomy), h/o Cardiogenic shock, h/o AVN Hip, Premature Ovarian Failure (LMP 2013)\par -SH: . 2 children. Non-smoker. Occasional EtOH use. \par \par TRICIA is a 42 year F whom is here today for f/u Anxiety, Depression\par \par Specialists Involved:\par -GynOnc: Dr. Vero Bowers\par -Heme/Onc: Dr. Néstor Roberts\par -Breast Sg: Dr. Lynda Bermeo\par -GI: Dr. Consuelo Ovalle\par -Cardio: Dr. Emory Shepard\par -VascSx: Dr. Brock Donahue\par \par -F/u DEXA\par -Continue f/u w/ PM&R (Chronic Pain)\par -Continue f/u w/ Gyn/Onc & Heme/Onc (Cervical CA)\par -Continue routine f/u w/ Cardio (h/o PE, Chronic DVT)\par -RTO 6mo for CPE or sooner if needed

## 2023-03-02 ENCOUNTER — APPOINTMENT (OUTPATIENT)
Dept: RADIOLOGY | Facility: CLINIC | Age: 43
End: 2023-03-02
Payer: COMMERCIAL

## 2023-03-02 ENCOUNTER — OUTPATIENT (OUTPATIENT)
Dept: OUTPATIENT SERVICES | Facility: HOSPITAL | Age: 43
LOS: 1 days | End: 2023-03-02
Payer: COMMERCIAL

## 2023-03-02 DIAGNOSIS — Z98.89 OTHER SPECIFIED POSTPROCEDURAL STATES: Chronic | ICD-10-CM

## 2023-03-02 DIAGNOSIS — Z96.22 MYRINGOTOMY TUBE(S) STATUS: Chronic | ICD-10-CM

## 2023-03-02 DIAGNOSIS — R93.3 ABNORMAL FINDINGS ON DIAGNOSTIC IMAGING OF OTHER PARTS OF DIGESTIVE TRACT: Chronic | ICD-10-CM

## 2023-03-02 DIAGNOSIS — Z13.820 ENCOUNTER FOR SCREENING FOR OSTEOPOROSIS: ICD-10-CM

## 2023-03-02 DIAGNOSIS — Z86.711 PERSONAL HISTORY OF PULMONARY EMBOLISM: Chronic | ICD-10-CM

## 2023-03-02 DIAGNOSIS — C53.9 MALIGNANT NEOPLASM OF CERVIX UTERI, UNSPECIFIED: Chronic | ICD-10-CM

## 2023-03-02 PROCEDURE — 77080 DXA BONE DENSITY AXIAL: CPT | Mod: 26

## 2023-03-02 PROCEDURE — 77080 DXA BONE DENSITY AXIAL: CPT

## 2023-03-07 ENCOUNTER — OUTPATIENT (OUTPATIENT)
Dept: OUTPATIENT SERVICES | Facility: HOSPITAL | Age: 43
LOS: 1 days | Discharge: HOME | End: 2023-03-07

## 2023-03-07 DIAGNOSIS — C53.9 MALIGNANT NEOPLASM OF CERVIX UTERI, UNSPECIFIED: Chronic | ICD-10-CM

## 2023-03-07 DIAGNOSIS — Z96.22 MYRINGOTOMY TUBE(S) STATUS: Chronic | ICD-10-CM

## 2023-03-07 DIAGNOSIS — Z98.89 OTHER SPECIFIED POSTPROCEDURAL STATES: Chronic | ICD-10-CM

## 2023-03-07 DIAGNOSIS — R93.3 ABNORMAL FINDINGS ON DIAGNOSTIC IMAGING OF OTHER PARTS OF DIGESTIVE TRACT: Chronic | ICD-10-CM

## 2023-03-07 DIAGNOSIS — C53.9 MALIGNANT NEOPLASM OF CERVIX UTERI, UNSPECIFIED: ICD-10-CM

## 2023-03-07 DIAGNOSIS — Z86.711 PERSONAL HISTORY OF PULMONARY EMBOLISM: Chronic | ICD-10-CM

## 2023-03-08 ENCOUNTER — NON-APPOINTMENT (OUTPATIENT)
Age: 43
End: 2023-03-08

## 2023-03-09 ENCOUNTER — LABORATORY RESULT (OUTPATIENT)
Age: 43
End: 2023-03-09

## 2023-03-09 ENCOUNTER — APPOINTMENT (OUTPATIENT)
Dept: INTERNAL MEDICINE | Facility: CLINIC | Age: 43
End: 2023-03-09
Payer: COMMERCIAL

## 2023-03-09 ENCOUNTER — NON-APPOINTMENT (OUTPATIENT)
Age: 43
End: 2023-03-09

## 2023-03-09 VITALS
SYSTOLIC BLOOD PRESSURE: 130 MMHG | RESPIRATION RATE: 16 BRPM | DIASTOLIC BLOOD PRESSURE: 81 MMHG | TEMPERATURE: 97.1 F | HEIGHT: 64 IN | OXYGEN SATURATION: 99 % | WEIGHT: 200 LBS | BODY MASS INDEX: 34.15 KG/M2 | HEART RATE: 93 BPM

## 2023-03-09 DIAGNOSIS — Z95.828 PRESENCE OF OTHER VASCULAR IMPLANTS AND GRAFTS: ICD-10-CM

## 2023-03-09 DIAGNOSIS — Z85.41 PERSONAL HISTORY OF MALIGNANT NEOPLASM OF CERVIX UTERI: ICD-10-CM

## 2023-03-09 PROCEDURE — 36415 COLL VENOUS BLD VENIPUNCTURE: CPT

## 2023-03-09 PROCEDURE — 99214 OFFICE O/P EST MOD 30 MIN: CPT | Mod: 25

## 2023-03-09 PROCEDURE — 93000 ELECTROCARDIOGRAM COMPLETE: CPT

## 2023-03-10 ENCOUNTER — APPOINTMENT (OUTPATIENT)
Dept: CARDIOLOGY | Facility: CLINIC | Age: 43
End: 2023-03-10
Payer: COMMERCIAL

## 2023-03-10 ENCOUNTER — NON-APPOINTMENT (OUTPATIENT)
Age: 43
End: 2023-03-10

## 2023-03-10 VITALS
HEART RATE: 91 BPM | TEMPERATURE: 98 F | HEIGHT: 64 IN | OXYGEN SATURATION: 96 % | WEIGHT: 198 LBS | BODY MASS INDEX: 33.8 KG/M2 | SYSTOLIC BLOOD PRESSURE: 126 MMHG | DIASTOLIC BLOOD PRESSURE: 86 MMHG

## 2023-03-10 DIAGNOSIS — Z01.810 ENCOUNTER FOR PREPROCEDURAL CARDIOVASCULAR EXAMINATION: ICD-10-CM

## 2023-03-10 DIAGNOSIS — R07.89 OTHER CHEST PAIN: ICD-10-CM

## 2023-03-10 LAB
ANION GAP SERPL CALC-SCNC: 17 MMOL/L
BASOPHILS # BLD AUTO: 0.06 K/UL
BASOPHILS NFR BLD AUTO: 0.7 %
BUN SERPL-MCNC: 16 MG/DL
CALCIUM SERPL-MCNC: 10.1 MG/DL
CHLORIDE SERPL-SCNC: 103 MMOL/L
CO2 SERPL-SCNC: 21 MMOL/L
CREAT SERPL-MCNC: 1.1 MG/DL
EGFR: 65 ML/MIN/1.73M2
EOSINOPHIL # BLD AUTO: 0.25 K/UL
EOSINOPHIL NFR BLD AUTO: 3 %
GLUCOSE SERPL-MCNC: 111 MG/DL
HCT VFR BLD CALC: 43.1 %
HGB BLD-MCNC: 13.4 G/DL
IMM GRANULOCYTES NFR BLD AUTO: 0.2 %
LYMPHOCYTES # BLD AUTO: 2.49 K/UL
LYMPHOCYTES NFR BLD AUTO: 29.7 %
MAN DIFF?: NORMAL
MCHC RBC-ENTMCNC: 26.7 PG
MCHC RBC-ENTMCNC: 31.1 GM/DL
MCV RBC AUTO: 86 FL
MONOCYTES # BLD AUTO: 0.47 K/UL
MONOCYTES NFR BLD AUTO: 5.6 %
NEUTROPHILS # BLD AUTO: 5.08 K/UL
NEUTROPHILS NFR BLD AUTO: 60.8 %
PLATELET # BLD AUTO: 332 K/UL
POTASSIUM SERPL-SCNC: 4.7 MMOL/L
RBC # BLD: 5.01 M/UL
RBC # FLD: 13.8 %
SODIUM SERPL-SCNC: 141 MMOL/L
WBC # FLD AUTO: 8.37 K/UL

## 2023-03-10 PROCEDURE — 93000 ELECTROCARDIOGRAM COMPLETE: CPT

## 2023-03-10 PROCEDURE — 99215 OFFICE O/P EST HI 40 MIN: CPT

## 2023-03-10 NOTE — ASSESSMENT
[Patient Optimized for Surgery] : Patient optimized for surgery [No Further Testing Recommended] : no further testing recommended [FreeTextEntry4] : 42-year-old female with past medical history pertinent for chronic left lower extremity DVT status post IVC filter and history of PE status post embolectomy who is on long-term anticoagulation with Xarelto as per hematology.  Patient is here today for medical clearance for upcoming low risk procedure.\par \par Vital signs and EKG and physical exam obtained in office today WNL\par Presurgical testing labs drawn in office today.  (Reviewed on 3/10/23) Normal \par patient optimized to proceed with upcoming surgery.\par Prior to proceeding with surgery I recommend consultation with hematology to determine if patient is approved to discontinue her anticoagulation temporarily prior to surgery.\par Avoid NSAIDs & OTC Vitamin supplements at least 7 days prior to surgery\par Continue all other meds unless instructed otherwise

## 2023-03-10 NOTE — HISTORY OF PRESENT ILLNESS
[No Pertinent Cardiac History] : no history of aortic stenosis, atrial fibrillation, coronary artery disease, recent myocardial infarction, or implantable device/pacemaker [No Pertinent Pulmonary History] : no history of asthma, COPD, sleep apnea, or smoking [No Adverse Anesthesia Reaction] : no adverse anesthesia reaction in self or family member [Chronic Anticoagulation] : chronic anticoagulation [(Patient denies any chest pain, claudication, dyspnea on exertion, orthopnea, palpitations or syncope)] : Patient denies any chest pain, claudication, dyspnea on exertion, orthopnea, palpitations or syncope [Excellent (>10 METs)] : Excellent (>10 METs) [Anticoagulants: _____] : Anticoagulants: [unfilled] [Chronic Kidney Disease] : no chronic kidney disease [Diabetes] : no diabetes [FreeTextEntry1] : choristoma left eye  [FreeTextEntry2] : 3/22/23 [FreeTextEntry3] : Dr. Nevarez [FreeTextEntry4] : TRICIA is a 42 year F whom is here today for medical clearance\par Today, pt reports feeling well \par She has upcoming appointment for clearance with Heme & Cardio \par \par Medical Hx Pertinent for the following:\par -Chronic LLE DVT (s/p IVC Filter) & h/o PE (s/p embolectomy): LTAC w/ Xarelto 20mg QD per Heme/Onc. 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 need to consult w/ ENT\par -h/o Cervical CA (Dx 2/2013, s/p Chemo & RT): Managed & Monitored as per Onc\par -Premature Ovarian Failure (LMP 2013): Managed on CombiPatch

## 2023-03-13 ENCOUNTER — APPOINTMENT (OUTPATIENT)
Dept: CARDIOLOGY | Facility: CLINIC | Age: 43
End: 2023-03-13
Payer: COMMERCIAL

## 2023-03-13 ENCOUNTER — NON-APPOINTMENT (OUTPATIENT)
Age: 43
End: 2023-03-13

## 2023-03-13 DIAGNOSIS — R31.29 OTHER MICROSCOPIC HEMATURIA: ICD-10-CM

## 2023-03-13 LAB
APPEARANCE: ABNORMAL
BILIRUBIN URINE: NEGATIVE
BLOOD URINE: ABNORMAL
COLOR: YELLOW
GLUCOSE QUALITATIVE U: NEGATIVE
KETONES URINE: NEGATIVE
LEUKOCYTE ESTERASE URINE: ABNORMAL
NITRITE URINE: NEGATIVE
PH URINE: 5.5
PROTEIN URINE: ABNORMAL
SPECIFIC GRAVITY URINE: 1.03
UROBILINOGEN URINE: NORMAL

## 2023-03-13 PROCEDURE — 93015 CV STRESS TEST SUPVJ I&R: CPT

## 2023-03-14 ENCOUNTER — APPOINTMENT (OUTPATIENT)
Dept: HEMATOLOGY ONCOLOGY | Facility: CLINIC | Age: 43
End: 2023-03-14
Payer: COMMERCIAL

## 2023-03-14 VITALS
HEIGHT: 64 IN | HEART RATE: 92 BPM | DIASTOLIC BLOOD PRESSURE: 86 MMHG | TEMPERATURE: 97.9 F | BODY MASS INDEX: 34.15 KG/M2 | OXYGEN SATURATION: 95 % | SYSTOLIC BLOOD PRESSURE: 138 MMHG | WEIGHT: 200 LBS

## 2023-03-14 DIAGNOSIS — Z01.818 ENCOUNTER FOR OTHER PREPROCEDURAL EXAMINATION: ICD-10-CM

## 2023-03-14 PROCEDURE — 99214 OFFICE O/P EST MOD 30 MIN: CPT

## 2023-03-14 NOTE — RESULTS/DATA
[FreeTextEntry1] : 10/23/17 PET\par IMPRESSION:  \par Since outside PET/CT February 19, 2016:\par 1.  No evidence of malignancy.\par 2.  Mild diffuse thyroid FDG avidity, probably thyroiditis. Clinical correlation suggested.\par 3.  Unchanged FDG avid tonsillar enlargement and posterior nasopharyngeal FDG avidity as well as mildly FDG avid right cervical lymph node, probably reactive. Clinical correlation suggested.

## 2023-03-14 NOTE — ASSESSMENT
[FreeTextEntry1] : 42 year old female \par \par \par 2)#-H/o DVT + PE in 2013, s/p thrombectomy.\par -given life threatening event, plan is for long term AC\par -Continues on Xarelto 20 mg\par -L eyes surgery pending on 3/22/2023 for choristoma. Advised to hold Xarelto for 2 days prior to surgery, day of surgery and resume day after surgery if cleared by her eye surgeon and once hemostasis is achieved.  Advised to monitor for leg swelling and difficulty breathing. She may proceed with eye surgery from hematology standpoint. \par \par \par # history of cervical cancer s/p definitive chemoradiation in June 2013.  She is 10 years out from treatment. \par For surveillance, need yearly GYN exam\par No role for routine imaging, it will be symptom driven\par \par \par 3/9/23 CBC WNL, HGB 13.4\par \par RTO 1 year.

## 2023-03-14 NOTE — HISTORY OF PRESENT ILLNESS
[de-identified] : Ms. 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  She has a history of abnormal pap smear in 2008, positive HPV. \par In February 2013, she had a LEEP procedure which showed invasive squamous cell carcinoma of the cervix in all 4 quadrants of endocervix.  She subsequently had a PET CT which showed positive bilateral common iliac nodes and a node at aortic bifurcation and external iliac nodes. She subsequently had exploratory lap on 3/25/13, and was ultimately found to have metastatic disease to the left internal iliac and right common iliac node. She subsequently received definitive chemoradiation with weekly cisplatin completed in June 2013.  She then received 2800 cGY HDR intracavitary brachytherapy completed in July 2013.  Her course was complicated by DVT and large central pulmonary embolism, cardiogenic shock, and subsequently had embolectomy on 6/18/13.  She remains on warfarin.\par \par Last surveillance PET CT on 2/19/2016 showed BRANDI.  She last had a colposcopy on 7/18/16 by Dr. Vale Gavin which showed HGSIL.  She was told to use Aldara which she last used in Summer 2016. \par \par She's currently recovering from a bilateral ear infection which she has had for 3 weeks. She was seen at urgent care gave her Z-hans.   She is not feeling better after antibiotics. \par She is seeing PCP tomorrow.\par \par [place] [facility] [place]. She reports her weight fluctuates. She reports a poor appetite. She has chronic pelvic pain at rest. Denies any vaginal discharge. No vaginal bleeding.  She occasionally has hematuria.   [de-identified] : Returns for follow up. Last office visit was in 6/2022.\par Planning on left surgery on 3/22/2023 for choristoma with Dr Matute eye surgeon\par Currently on vitamin D3 2000 IU daily \par Continues on Xarelto 20 mg, no bleeding issues. \par Evaluated by GYN/ONC Dr. Bowers on 7//22, underwent pap smear. \par Currently on hormone replacement surgery managed by Dr. Bowers\par \par \par

## 2023-03-14 NOTE — ADDENDUM
[FreeTextEntry1] : Documented by Shama Arrington acting as scribe for  on 03/14/2023 \par \par All Medical record entries made by the Scribe were at my, 's , direction and personally dictated by me on 03/14/2023 . I have reviewed the chart and agree that the record accurately reflects my personal performance of the history, physical exam, assessment and plan. I have also personally directed, reviewed, and agreed with the discharge instructions.\par

## 2023-03-17 ENCOUNTER — TRANSCRIPTION ENCOUNTER (OUTPATIENT)
Age: 43
End: 2023-03-17

## 2023-06-02 ENCOUNTER — TRANSCRIPTION ENCOUNTER (OUTPATIENT)
Age: 43
End: 2023-06-02

## 2023-06-05 ENCOUNTER — TRANSCRIPTION ENCOUNTER (OUTPATIENT)
Age: 43
End: 2023-06-05

## 2023-06-06 NOTE — HISTORY OF PRESENT ILLNESS
[FreeTextEntry1] : 43yo  LMP May 2013 presented on 2022 with self referral for Cervical Cancer SVL. In 2013, she had a LEEP procedure which showed invasive squamous cell carcinoma of the cervix in all 4 quadrants of endocervix. She subsequently had a PET CT which showed positive bilateral common iliac nodes and a node at aortic bifurcation and external iliac nodes. She then had exploratory laparotomy on 3/25/13, and was ultimately found to have metastatic disease to the left internal iliac and right common iliac node. She received definitive chemoradiation with weekly cisplatin completed in 2013. She then received 2800 cGY HDR intracavitary brachytherapy completed in 2013. Her course was complicated by DVT and large central pulmonary embolism, cardiogenic shock, and subsequently had embolectomy on 13. She remained on warfarin. \par \par [place] [facility] [place] to [facility] and was last seen by Dr. Roberts in . She reports that she had since not followed up due to difficulty with travel. She had also not used vaginal dilators in years and reported severe vaginal stenosis making it difficult for her providers to perform vaginal PAP's. Patient with radiation proctitis and cystitis since  as well as daily moderate abdominal pain exacerbated with bending. She occasionally has to wear diapers due to the above. She does not report any acute change in pain over the last few months. \par \par Discussed w/ pt that we could consider combined hormonal replacement therapy if cleared by Dr. Roberts to help with vaginal narrowing and discomfort, in the context that her clot was 10 years ago and she had since had no other episodes while on treatment this might be okay. Reach out to Dr. Roberts for her opinion. \par \par Discussed with patient that she is suffering from PTSD from all cancer related diagnoses. I thought she would greatly benefit from a psychiatrist/psychologist. Patient given referral to psychologist Dr. Stuart. Also reach out to Dr. Hathaway to see if referral is appropriate. Dr. Hathaway would be able to fill xanax if necessary and also provide further counseling. \par \par On 23 patient was started on CombiPatch. \par \par HM: \par DEXA 3/2/23 osteopenia \par Mammogram, breast US 22: BI-RADS 2 benign \par

## 2023-06-06 NOTE — REASON FOR VISIT
[FreeTextEntry1] : [place] Location \par \par [facility] [place]. [phone]\par [address] [place] [address] \par \par Hx of cervical cancer tx a [facility] transferred cared to [facility] 2017 w/ Dr. Roberts \par S/p Ex=lap 3/25/13\par Definitive chemoradiation w/ weekly cisplatin completed 6/2013 & 2800 cGY HDR intracavitary brachytherapy completed 7/2013 \par On Combi patch since 6/2022\par Surveillance

## 2023-06-06 NOTE — PHYSICAL EXAM
[Chaperone Present] : A chaperone was present in the examining room during all aspects of the physical examination [FreeTextEntry1] : Jo Ann Erazo Medical assistant chaperoned during gynecologic exam.  [Normal] : Mood and affect: Normal

## 2023-06-06 NOTE — END OF VISIT
[FreeTextEntry3] : Written by Jo Ann Erazo, acting as a scribe for Dr. Vero Unger This note accurately reflects the work and decisions made by me.

## 2023-06-08 ENCOUNTER — APPOINTMENT (OUTPATIENT)
Dept: GYNECOLOGIC ONCOLOGY | Facility: CLINIC | Age: 43
End: 2023-06-08

## 2023-06-14 ENCOUNTER — APPOINTMENT (OUTPATIENT)
Dept: HEMATOLOGY ONCOLOGY | Facility: CLINIC | Age: 43
End: 2023-06-14

## 2023-08-28 RX ORDER — FOLIC ACID 1 MG/1
1 TABLET ORAL DAILY
Qty: 90 | Refills: 3 | Status: ACTIVE | COMMUNITY
Start: 2022-06-03 | End: 1900-01-01

## 2023-08-31 ENCOUNTER — APPOINTMENT (OUTPATIENT)
Dept: INTERNAL MEDICINE | Facility: CLINIC | Age: 43
End: 2023-08-31
Payer: COMMERCIAL

## 2023-08-31 VITALS
SYSTOLIC BLOOD PRESSURE: 130 MMHG | WEIGHT: 200 LBS | BODY MASS INDEX: 34.15 KG/M2 | HEIGHT: 64 IN | OXYGEN SATURATION: 98 % | HEART RATE: 87 BPM | TEMPERATURE: 97.3 F | RESPIRATION RATE: 16 BRPM | DIASTOLIC BLOOD PRESSURE: 98 MMHG

## 2023-08-31 DIAGNOSIS — R73.03 PREDIABETES.: ICD-10-CM

## 2023-08-31 DIAGNOSIS — F32.A DEPRESSION, UNSPECIFIED: ICD-10-CM

## 2023-08-31 DIAGNOSIS — N17.9 ACUTE KIDNEY FAILURE, UNSPECIFIED: ICD-10-CM

## 2023-08-31 DIAGNOSIS — F41.9 ANXIETY DISORDER, UNSPECIFIED: ICD-10-CM

## 2023-08-31 DIAGNOSIS — E66.9 OBESITY, UNSPECIFIED: ICD-10-CM

## 2023-08-31 PROCEDURE — 36415 COLL VENOUS BLD VENIPUNCTURE: CPT

## 2023-08-31 PROCEDURE — 99214 OFFICE O/P EST MOD 30 MIN: CPT | Mod: 25

## 2023-09-01 ENCOUNTER — NON-APPOINTMENT (OUTPATIENT)
Age: 43
End: 2023-09-01

## 2023-09-01 PROBLEM — N17.9 AKI (ACUTE KIDNEY INJURY): Status: ACTIVE | Noted: 2023-09-01

## 2023-09-01 LAB
ALBUMIN SERPL ELPH-MCNC: 4.3 G/DL
ALP BLD-CCNC: 87 U/L
ALT SERPL-CCNC: 16 U/L
ANION GAP SERPL CALC-SCNC: 14 MMOL/L
AST SERPL-CCNC: 12 U/L
BILIRUB SERPL-MCNC: 0.2 MG/DL
BUN SERPL-MCNC: 28 MG/DL
CALCIUM SERPL-MCNC: 9.5 MG/DL
CHLORIDE SERPL-SCNC: 105 MMOL/L
CHOLEST SERPL-MCNC: 235 MG/DL
CO2 SERPL-SCNC: 23 MMOL/L
CREAT SERPL-MCNC: 1.51 MG/DL
EGFR: 44 ML/MIN/1.73M2
ESTIMATED AVERAGE GLUCOSE: 131 MG/DL
GLUCOSE SERPL-MCNC: 107 MG/DL
HBA1C MFR BLD HPLC: 6.2 %
HDLC SERPL-MCNC: 63 MG/DL
LDLC SERPL CALC-MCNC: 153 MG/DL
NONHDLC SERPL-MCNC: 172 MG/DL
POTASSIUM SERPL-SCNC: 4.9 MMOL/L
PROT SERPL-MCNC: 7 G/DL
SODIUM SERPL-SCNC: 141 MMOL/L
TRIGL SERPL-MCNC: 106 MG/DL

## 2023-09-01 NOTE — ADDENDUM
[FreeTextEntry1] : Worsening LDL cholesterol as well as increasing A1c now 6.2 consistent with prediabetes.  Dietary modifications with goal of weight loss is encouraged.  We will continue to monitor every 6 months  Renal function reduced from baseline. This may be due to dehydration. Is patient taking daily NSAIDs?  If so she needs to stop  Please have her increase her p.o. hydration over the next 24 hours and go to a lab for repeat blood work

## 2023-09-01 NOTE — HISTORY OF PRESENT ILLNESS
[FreeTextEntry1] : Anxiety, Depression, POF [de-identified] : -PMH: Anxiety, Depression, GERD w/ Hiatal Hernia, IBS w/ Fecal Incontinence, h/o Cervical CA (Dx 2/2013, s/p Chemo & RT), Chronic LLE DVT (s/p IVC Filter), h/o PE (s/p embolectomy), h/o Cardiogenic shock, h/o AVN Hip, Premature Ovarian Failure (LMP 2013) -SH: . 2 children. Non-smoker. Occasional EtOH use.   TRICIA is a 42 year F whom is here today for f/u Anxiety, Depression Today, pt reports feeling well  -Chronic LLE DVT (s/p IVC Filter) & h/o PE (s/p embolectomy): LTAC w/ Xarelto 20mg QD per Heme/Onc. Compliant w/ b/l LE compression socks. Follows w/ Heme, Vascular & Cardio  -Anxiety & Depression: Remains on Sertraline 50mg QD. Denies SI/HI. Does not follow w/ a therapist. Overall improved.  -H/o b/l AVN Hip: 2/2 Prednisone during chemo and RT. Pain previously managed w/ PRN Oxycodone prescribed by her prior PMD which she has been off of. 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 need to consult w/ ENT  -GERD w/ Hiatal Hernia: Remains on Famotidine 40mg HS, Pantoprazole 40mg BID & Sucralfate 1g QID. Follows w/ GI. Plan additional imaging/procedures which is pending -IBS w/ Fecal Incontinence, Radiation Proctitis: Remains on meds as per GI  -h/o Cervical CA (Dx 2/2013, s/p Chemo & RT): Managed & Monitored as per Onc however she has been noncompliant and last saw Onc 2017.  -Premature Ovarian Failure (LMP 2013): Now on CombiPatch. Last DEXA 2019.

## 2023-09-05 ENCOUNTER — RX RENEWAL (OUTPATIENT)
Age: 43
End: 2023-09-05

## 2023-09-05 LAB
25(OH)D3 SERPL-MCNC: 31.6 NG/ML
ANION GAP SERPL CALC-SCNC: 13 MMOL/L
BUN SERPL-MCNC: 17 MG/DL
CALCIUM SERPL-MCNC: 9.8 MG/DL
CHLORIDE SERPL-SCNC: 101 MMOL/L
CO2 SERPL-SCNC: 26 MMOL/L
CREAT SERPL-MCNC: 1.49 MG/DL
EGFR: 45 ML/MIN/1.73M2
GLUCOSE SERPL-MCNC: 104 MG/DL
POTASSIUM SERPL-SCNC: 4.2 MMOL/L
SODIUM SERPL-SCNC: 140 MMOL/L

## 2023-09-06 ENCOUNTER — NON-APPOINTMENT (OUTPATIENT)
Age: 43
End: 2023-09-06

## 2023-09-06 DIAGNOSIS — R94.4 ABNORMAL RESULTS OF KIDNEY FUNCTION STUDIES: ICD-10-CM

## 2023-09-08 ENCOUNTER — OUTPATIENT (OUTPATIENT)
Dept: OUTPATIENT SERVICES | Facility: HOSPITAL | Age: 43
LOS: 1 days | End: 2023-09-08
Payer: COMMERCIAL

## 2023-09-08 ENCOUNTER — APPOINTMENT (OUTPATIENT)
Dept: ULTRASOUND IMAGING | Facility: CLINIC | Age: 43
End: 2023-09-08
Payer: COMMERCIAL

## 2023-09-08 DIAGNOSIS — Z96.22 MYRINGOTOMY TUBE(S) STATUS: Chronic | ICD-10-CM

## 2023-09-08 DIAGNOSIS — Z98.89 OTHER SPECIFIED POSTPROCEDURAL STATES: Chronic | ICD-10-CM

## 2023-09-08 DIAGNOSIS — R93.3 ABNORMAL FINDINGS ON DIAGNOSTIC IMAGING OF OTHER PARTS OF DIGESTIVE TRACT: Chronic | ICD-10-CM

## 2023-09-08 DIAGNOSIS — R94.4 ABNORMAL RESULTS OF KIDNEY FUNCTION STUDIES: ICD-10-CM

## 2023-09-08 DIAGNOSIS — Z86.711 PERSONAL HISTORY OF PULMONARY EMBOLISM: Chronic | ICD-10-CM

## 2023-09-08 DIAGNOSIS — C53.9 MALIGNANT NEOPLASM OF CERVIX UTERI, UNSPECIFIED: Chronic | ICD-10-CM

## 2023-09-08 PROCEDURE — 76775 US EXAM ABDO BACK WALL LIM: CPT | Mod: 26

## 2023-09-08 PROCEDURE — 76775 US EXAM ABDO BACK WALL LIM: CPT

## 2023-09-13 ENCOUNTER — APPOINTMENT (OUTPATIENT)
Dept: CARDIOLOGY | Facility: CLINIC | Age: 43
End: 2023-09-13
Payer: COMMERCIAL

## 2023-09-13 VITALS
TEMPERATURE: 98.1 F | BODY MASS INDEX: 32.95 KG/M2 | HEART RATE: 78 BPM | SYSTOLIC BLOOD PRESSURE: 123 MMHG | DIASTOLIC BLOOD PRESSURE: 82 MMHG | HEIGHT: 64 IN | OXYGEN SATURATION: 99 % | WEIGHT: 193 LBS

## 2023-09-13 DIAGNOSIS — R42 DIZZINESS AND GIDDINESS: ICD-10-CM

## 2023-09-13 DIAGNOSIS — R06.09 OTHER FORMS OF DYSPNEA: ICD-10-CM

## 2023-09-13 DIAGNOSIS — E78.5 HYPERLIPIDEMIA, UNSPECIFIED: ICD-10-CM

## 2023-09-13 DIAGNOSIS — Z00.00 ENCOUNTER FOR GENERAL ADULT MEDICAL EXAMINATION W/OUT ABNORMAL FINDINGS: ICD-10-CM

## 2023-09-13 PROCEDURE — 99215 OFFICE O/P EST HI 40 MIN: CPT

## 2023-09-13 PROCEDURE — 93000 ELECTROCARDIOGRAM COMPLETE: CPT

## 2023-09-14 ENCOUNTER — TRANSCRIPTION ENCOUNTER (OUTPATIENT)
Age: 43
End: 2023-09-14

## 2023-09-15 ENCOUNTER — TRANSCRIPTION ENCOUNTER (OUTPATIENT)
Age: 43
End: 2023-09-15

## 2023-09-15 ENCOUNTER — NON-APPOINTMENT (OUTPATIENT)
Age: 43
End: 2023-09-15

## 2023-09-18 ENCOUNTER — TRANSCRIPTION ENCOUNTER (OUTPATIENT)
Age: 43
End: 2023-09-18

## 2023-10-04 ENCOUNTER — TRANSCRIPTION ENCOUNTER (OUTPATIENT)
Age: 43
End: 2023-10-04

## 2023-10-06 ENCOUNTER — APPOINTMENT (OUTPATIENT)
Dept: GASTROENTEROLOGY | Facility: CLINIC | Age: 43
End: 2023-10-06

## 2023-10-14 NOTE — DISCHARGE NOTE NURSING/CASE MANAGEMENT/SOCIAL WORK - PATIENT PORTAL LINK FT
Yes You can access the FollowMyHealth Patient Portal offered by [facility] by registering at the following website: [email or web address]. By joining [employer]’s FollowMyHealth portal, you will also be able to view your health information using other applications (apps) compatible with our system.

## 2023-12-05 ENCOUNTER — NON-APPOINTMENT (OUTPATIENT)
Age: 43
End: 2023-12-05

## 2023-12-06 DIAGNOSIS — Z79.2 LONG TERM (CURRENT) USE OF ANTIBIOTICS: ICD-10-CM

## 2023-12-06 RX ORDER — AZITHROMYCIN 250 MG/1
250 TABLET, FILM COATED ORAL
Qty: 1 | Refills: 0 | Status: ACTIVE | COMMUNITY
Start: 2017-09-03 | End: 1900-01-01

## 2024-02-06 ENCOUNTER — NON-APPOINTMENT (OUTPATIENT)
Age: 44
End: 2024-02-06

## 2024-02-07 ENCOUNTER — APPOINTMENT (OUTPATIENT)
Dept: INTERNAL MEDICINE | Facility: CLINIC | Age: 44
End: 2024-02-07

## 2024-02-16 RX ORDER — RIVAROXABAN 20 MG/1
20 TABLET, FILM COATED ORAL
Qty: 90 | Refills: 1 | Status: ACTIVE | COMMUNITY
Start: 2022-06-24 | End: 1900-01-01

## 2024-02-27 ENCOUNTER — OUTPATIENT (OUTPATIENT)
Dept: OUTPATIENT SERVICES | Facility: HOSPITAL | Age: 44
LOS: 1 days | Discharge: HOME | End: 2024-02-27

## 2024-02-27 DIAGNOSIS — Z98.89 OTHER SPECIFIED POSTPROCEDURAL STATES: Chronic | ICD-10-CM

## 2024-02-27 DIAGNOSIS — Z96.22 MYRINGOTOMY TUBE(S) STATUS: Chronic | ICD-10-CM

## 2024-02-27 DIAGNOSIS — R93.3 ABNORMAL FINDINGS ON DIAGNOSTIC IMAGING OF OTHER PARTS OF DIGESTIVE TRACT: Chronic | ICD-10-CM

## 2024-02-27 DIAGNOSIS — C53.9 MALIGNANT NEOPLASM OF CERVIX UTERI, UNSPECIFIED: Chronic | ICD-10-CM

## 2024-02-27 DIAGNOSIS — C53.9 MALIGNANT NEOPLASM OF CERVIX UTERI, UNSPECIFIED: ICD-10-CM

## 2024-02-27 DIAGNOSIS — Z86.711 PERSONAL HISTORY OF PULMONARY EMBOLISM: Chronic | ICD-10-CM

## 2024-03-04 DIAGNOSIS — I82.502 CHRONIC EMBOLISM AND THROMBOSIS OF UNSPECIFIED DEEP VEINS OF LEFT LOWER EXTREMITY: ICD-10-CM

## 2024-03-05 ENCOUNTER — APPOINTMENT (OUTPATIENT)
Dept: HEMATOLOGY ONCOLOGY | Facility: CLINIC | Age: 44
End: 2024-03-05

## 2024-04-09 RX ORDER — SERTRALINE HYDROCHLORIDE 100 MG/1
100 TABLET, FILM COATED ORAL DAILY
Qty: 90 | Refills: 0 | Status: ACTIVE | COMMUNITY
Start: 2017-03-25 | End: 1900-01-01

## 2024-04-22 ENCOUNTER — RX RENEWAL (OUTPATIENT)
Age: 44
End: 2024-04-22

## 2024-05-06 ENCOUNTER — LABORATORY RESULT (OUTPATIENT)
Age: 44
End: 2024-05-06

## 2024-05-06 ENCOUNTER — APPOINTMENT (OUTPATIENT)
Dept: HEMATOLOGY ONCOLOGY | Facility: CLINIC | Age: 44
End: 2024-05-06
Payer: COMMERCIAL

## 2024-05-06 ENCOUNTER — RESULT REVIEW (OUTPATIENT)
Age: 44
End: 2024-05-06

## 2024-05-06 ENCOUNTER — OUTPATIENT (OUTPATIENT)
Dept: OUTPATIENT SERVICES | Facility: HOSPITAL | Age: 44
LOS: 1 days | Discharge: HOME | End: 2024-05-06

## 2024-05-06 VITALS
OXYGEN SATURATION: 97 % | HEART RATE: 96 BPM | DIASTOLIC BLOOD PRESSURE: 90 MMHG | BODY MASS INDEX: 34.02 KG/M2 | HEIGHT: 64 IN | WEIGHT: 199.25 LBS | SYSTOLIC BLOOD PRESSURE: 139 MMHG | TEMPERATURE: 97.9 F

## 2024-05-06 DIAGNOSIS — Z85.41 PERSONAL HISTORY OF MALIGNANT NEOPLASM OF CERVIX UTERI: ICD-10-CM

## 2024-05-06 DIAGNOSIS — Z86.711 PERSONAL HISTORY OF PULMONARY EMBOLISM: Chronic | ICD-10-CM

## 2024-05-06 DIAGNOSIS — Z98.89 OTHER SPECIFIED POSTPROCEDURAL STATES: Chronic | ICD-10-CM

## 2024-05-06 DIAGNOSIS — R93.3 ABNORMAL FINDINGS ON DIAGNOSTIC IMAGING OF OTHER PARTS OF DIGESTIVE TRACT: Chronic | ICD-10-CM

## 2024-05-06 DIAGNOSIS — Z96.22 MYRINGOTOMY TUBE(S) STATUS: Chronic | ICD-10-CM

## 2024-05-06 DIAGNOSIS — Z79.01 LONG TERM (CURRENT) USE OF ANTICOAGULANTS: ICD-10-CM

## 2024-05-06 DIAGNOSIS — C53.9 MALIGNANT NEOPLASM OF CERVIX UTERI, UNSPECIFIED: ICD-10-CM

## 2024-05-06 DIAGNOSIS — D31.92 BENIGN NEOPLASM OF UNSPECIFIED PART OF LEFT EYE: ICD-10-CM

## 2024-05-06 DIAGNOSIS — Z86.711 PERSONAL HISTORY OF PULMONARY EMBOLISM: ICD-10-CM

## 2024-05-06 DIAGNOSIS — C53.9 MALIGNANT NEOPLASM OF CERVIX UTERI, UNSPECIFIED: Chronic | ICD-10-CM

## 2024-05-06 DIAGNOSIS — E61.1 IRON DEFICIENCY: ICD-10-CM

## 2024-05-06 LAB
BASOPHILS # BLD AUTO: 0.1 K/UL — SIGNIFICANT CHANGE UP (ref 0–0.2)
BASOPHILS NFR BLD AUTO: 1.1 % — SIGNIFICANT CHANGE UP (ref 0–2)
EOSINOPHIL # BLD AUTO: 0.2 K/UL — SIGNIFICANT CHANGE UP (ref 0–0.5)
EOSINOPHIL NFR BLD AUTO: 3 % — SIGNIFICANT CHANGE UP (ref 0–6)
HCT VFR BLD CALC: 36.5 % — SIGNIFICANT CHANGE UP (ref 34.5–45)
HGB BLD-MCNC: 11.9 G/DL — SIGNIFICANT CHANGE UP (ref 11.5–15.5)
LYMPHOCYTES # BLD AUTO: 2.8 K/UL — SIGNIFICANT CHANGE UP (ref 1–3.3)
LYMPHOCYTES # BLD AUTO: 37.2 % — SIGNIFICANT CHANGE UP (ref 13–44)
MCHC RBC-ENTMCNC: 26.9 PG — LOW (ref 27–34)
MCHC RBC-ENTMCNC: 32.6 G/DL — SIGNIFICANT CHANGE UP (ref 32–36)
MCV RBC AUTO: 82.5 FL — SIGNIFICANT CHANGE UP (ref 80–100)
MONOCYTES # BLD AUTO: 0.5 K/UL — SIGNIFICANT CHANGE UP (ref 0–0.9)
MONOCYTES NFR BLD AUTO: 6.1 % — SIGNIFICANT CHANGE UP (ref 2–14)
NEUTROPHILS # BLD AUTO: 4 K/UL — SIGNIFICANT CHANGE UP (ref 1.8–7.4)
NEUTROPHILS NFR BLD AUTO: 52.6 % — SIGNIFICANT CHANGE UP (ref 43–77)
PLATELET # BLD AUTO: 290 K/UL — SIGNIFICANT CHANGE UP (ref 150–400)
RBC # BLD: 4.43 M/UL — SIGNIFICANT CHANGE UP (ref 3.8–5.2)
RBC # FLD: 12.2 % — SIGNIFICANT CHANGE UP (ref 10.3–14.5)
WBC # BLD: 7.6 K/UL — SIGNIFICANT CHANGE UP (ref 3.8–10.5)
WBC # FLD AUTO: 7.6 K/UL — SIGNIFICANT CHANGE UP (ref 3.8–10.5)

## 2024-05-06 PROCEDURE — 99214 OFFICE O/P EST MOD 30 MIN: CPT

## 2024-05-06 PROCEDURE — G2211 COMPLEX E/M VISIT ADD ON: CPT

## 2024-05-06 RX ORDER — DICYCLOMINE HYDROCHLORIDE 20 MG/1
20 TABLET ORAL 3 TIMES DAILY
Qty: 90 | Refills: 1 | Status: DISCONTINUED | COMMUNITY
Start: 2020-04-28 | End: 2024-05-06

## 2024-05-06 RX ORDER — LOPERAMIDE HYDROCHLORIDE 2 MG/1
2 CAPSULE ORAL
Qty: 60 | Refills: 5 | Status: DISCONTINUED | COMMUNITY
Start: 2020-12-14 | End: 2024-05-06

## 2024-05-06 RX ORDER — PANTOPRAZOLE 40 MG/1
40 TABLET, DELAYED RELEASE ORAL
Qty: 180 | Refills: 0 | Status: DISCONTINUED | COMMUNITY
End: 2024-05-06

## 2024-05-06 RX ORDER — SUCRALFATE 1 G/1
1 TABLET ORAL
Qty: 360 | Refills: 2 | Status: DISCONTINUED | COMMUNITY
Start: 2021-04-30 | End: 2024-05-06

## 2024-05-08 PROBLEM — E61.1 IRON DEFICIENCY: Status: ACTIVE | Noted: 2024-05-08

## 2024-05-08 PROBLEM — D31.92: Status: ACTIVE | Noted: 2023-03-01

## 2024-05-08 PROBLEM — Z85.41: Status: RESOLVED | Noted: 2017-09-13 | Resolved: 2023-03-01

## 2024-05-08 LAB
ALBUMIN SERPL ELPH-MCNC: 4.6 G/DL
ALP BLD-CCNC: 81 U/L
ALT SERPL-CCNC: 15 U/L
ANION GAP SERPL CALC-SCNC: 10 MMOL/L
AST SERPL-CCNC: 14 U/L
BILIRUB SERPL-MCNC: 0.2 MG/DL
BUN SERPL-MCNC: 16 MG/DL
CALCIUM SERPL-MCNC: 9.9 MG/DL
CHLORIDE SERPL-SCNC: 103 MMOL/L
CO2 SERPL-SCNC: 26 MMOL/L
CREAT SERPL-MCNC: 1.38 MG/DL
EGFR: 49 ML/MIN/1.73M2
GLUCOSE SERPL-MCNC: 92 MG/DL
POTASSIUM SERPL-SCNC: 4.5 MMOL/L
PROT SERPL-MCNC: 7.3 G/DL
SODIUM SERPL-SCNC: 140 MMOL/L

## 2024-05-08 NOTE — ASSESSMENT
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    2)#-H/o DVT + PE in 2013, s/p thrombectomy. -given life threatening event, plan is for long term AC -Continues on Xarelto 20 mg -L eyes surgery pending on 3/22/2023 for choristoma. Advised to hold Xarelto for 2 days prior to surgery, day of surgery and resume day after surgery if cleared by her eye surgeon and once hemostasis is achieved.  Advised to monitor for leg swelling and difficulty breathing. She may proceed with eye surgery from hematology standpoint.   # history of cervical cancer s/p definitive chemoradiation in June 2013.  She is 11 years out from treatment.  Follow up with GYN    Plan Advised Dermatology eval if rash persist.  Follow up with GYN Continue Xarelto 20 mg Mild iron deficiency - start slow release iron MWF RTO with NP in 3 months

## 2024-05-08 NOTE — HISTORY OF PRESENT ILLNESS
[de-identified] : Ms. 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  She has a history of abnormal pap smear in 2008, positive HPV.  In February 2013, she had a LEEP procedure which showed invasive squamous cell carcinoma of the cervix in all 4 quadrants of endocervix.  She subsequently had a PET CT which showed positive bilateral common iliac nodes and a node at aortic bifurcation and external iliac nodes. She subsequently had exploratory lap on 3/25/13, and was ultimately found to have metastatic disease to the left internal iliac and right common iliac node. She subsequently received definitive chemoradiation with weekly cisplatin completed in June 2013.  She then received 2800 cGY HDR intracavitary brachytherapy completed in July 2013.  Her course was complicated by DVT and large central pulmonary embolism, cardiogenic shock, and subsequently had embolectomy on 6/18/13.  She remains on warfarin.  Last surveillance PET CT on 2/19/2016 showed BRANDI.  She last had a colposcopy on 7/18/16 by Dr. Vale Gavin which showed HGSIL.  She was told to use Aldara which she last used in Summer 2016.   She's currently recovering from a bilateral ear infection which she has had for 3 weeks. She was seen at urgent care gave her Z-hans.   She is not feeling better after antibiotics.  She is seeing PCP tomorrow.  She has not seen a physician at [facility] for 1 year. She reports her weight fluctuates. She reports a poor appetite. She has chronic pelvic pain at rest. Denies any vaginal discharge. No vaginal bleeding.  She occasionally has hematuria.   [de-identified] : Returns for follow up. Last office visit was in 03/14/2023 Patient c/o of a rash since Sunday that looks like a sunburn, located across the medial aspect of her arms bilaterally and under abdominal folds noticed by her daughter. She has tingling/itchy sensation on rash area.  Has occasional hematuria, Has recently lost 5 pounds without trying. Has decreased appetite Denies blood in stool, or bleeding issues.  Not taking any medication for IBS No longer in hormone replacement therapy Patient takes the rest of her medications as prescribed.  Continues on Xarelto 20 mg Patient never had a radical hysterectomy. Just got chemo and radiation.

## 2024-05-08 NOTE — CONSULT LETTER
[Dear  ___] : Dear  [unfilled], [Consult Letter:] : I had the pleasure of evaluating your patient, [unfilled]. [Please see my note below.] : Please see my note below. [Sincerely,] : Sincerely, [FreeTextEntry3] : Néstor Roberts MD\par  Medical Oncology/Hematology\par  [facility] [place], [facility]\par  \par  [facility] [employer] [facility]\par  \par

## 2024-05-08 NOTE — ADDENDUM
[FreeTextEntry1] :  Documented by Massiel Orourke acting as a scribe for Dr. Roberts 05/06/2024.   All medical record entries made by the Scribe at my, Dr. Roberts's, direction and personally dictated by me on 05/06/2024. I have reviewed the chart and agree that the record accurately reflets my personal performance of the history, physical exam, assessment and plan. I have also personally directed, reviewed, and agreed with the discharge instructions.

## 2024-05-17 ENCOUNTER — NON-APPOINTMENT (OUTPATIENT)
Age: 44
End: 2024-05-17

## 2024-06-17 ENCOUNTER — APPOINTMENT (OUTPATIENT)
Dept: INTERNAL MEDICINE | Facility: CLINIC | Age: 44
End: 2024-06-17

## 2024-08-02 ENCOUNTER — OUTPATIENT (OUTPATIENT)
Dept: OUTPATIENT SERVICES | Facility: HOSPITAL | Age: 44
LOS: 1 days | Discharge: HOME | End: 2024-08-02

## 2024-08-02 DIAGNOSIS — Z98.89 OTHER SPECIFIED POSTPROCEDURAL STATES: Chronic | ICD-10-CM

## 2024-08-02 DIAGNOSIS — C53.9 MALIGNANT NEOPLASM OF CERVIX UTERI, UNSPECIFIED: ICD-10-CM

## 2024-08-02 DIAGNOSIS — Z96.22 MYRINGOTOMY TUBE(S) STATUS: Chronic | ICD-10-CM

## 2024-08-02 DIAGNOSIS — R93.3 ABNORMAL FINDINGS ON DIAGNOSTIC IMAGING OF OTHER PARTS OF DIGESTIVE TRACT: Chronic | ICD-10-CM

## 2024-08-02 DIAGNOSIS — C53.9 MALIGNANT NEOPLASM OF CERVIX UTERI, UNSPECIFIED: Chronic | ICD-10-CM

## 2024-08-02 DIAGNOSIS — Z86.711 PERSONAL HISTORY OF PULMONARY EMBOLISM: Chronic | ICD-10-CM

## 2024-08-12 ENCOUNTER — APPOINTMENT (OUTPATIENT)
Dept: HEMATOLOGY ONCOLOGY | Facility: CLINIC | Age: 44
End: 2024-08-12
Payer: COMMERCIAL

## 2024-08-12 ENCOUNTER — RESULT REVIEW (OUTPATIENT)
Age: 44
End: 2024-08-12

## 2024-08-12 VITALS
HEART RATE: 81 BPM | RESPIRATION RATE: 17 BRPM | HEIGHT: 64 IN | WEIGHT: 200.62 LBS | SYSTOLIC BLOOD PRESSURE: 126 MMHG | BODY MASS INDEX: 34.25 KG/M2 | DIASTOLIC BLOOD PRESSURE: 85 MMHG | OXYGEN SATURATION: 96 % | TEMPERATURE: 97.2 F

## 2024-08-12 DIAGNOSIS — Z85.41 PERSONAL HISTORY OF MALIGNANT NEOPLASM OF CERVIX UTERI: ICD-10-CM

## 2024-08-12 DIAGNOSIS — Z86.711 PERSONAL HISTORY OF PULMONARY EMBOLISM: ICD-10-CM

## 2024-08-12 LAB
BASOPHILS # BLD AUTO: 0.1 K/UL — SIGNIFICANT CHANGE UP (ref 0–0.2)
BASOPHILS NFR BLD AUTO: 1.4 % — SIGNIFICANT CHANGE UP (ref 0–2)
EOSINOPHIL # BLD AUTO: 0.3 K/UL — SIGNIFICANT CHANGE UP (ref 0–0.5)
EOSINOPHIL NFR BLD AUTO: 4 % — SIGNIFICANT CHANGE UP (ref 0–6)
HCT VFR BLD CALC: 38.3 % — SIGNIFICANT CHANGE UP (ref 34.5–45)
HGB BLD-MCNC: 12.2 G/DL — SIGNIFICANT CHANGE UP (ref 11.5–15.5)
LYMPHOCYTES # BLD AUTO: 2.3 K/UL — SIGNIFICANT CHANGE UP (ref 1–3.3)
LYMPHOCYTES # BLD AUTO: 33.6 % — SIGNIFICANT CHANGE UP (ref 13–44)
MCHC RBC-ENTMCNC: 27.1 PG — SIGNIFICANT CHANGE UP (ref 27–34)
MCHC RBC-ENTMCNC: 31.9 G/DL — LOW (ref 32–36)
MCV RBC AUTO: 84.8 FL — SIGNIFICANT CHANGE UP (ref 80–100)
MONOCYTES # BLD AUTO: 0.5 K/UL — SIGNIFICANT CHANGE UP (ref 0–0.9)
MONOCYTES NFR BLD AUTO: 7.2 % — SIGNIFICANT CHANGE UP (ref 2–14)
NEUTROPHILS # BLD AUTO: 3.6 K/UL — SIGNIFICANT CHANGE UP (ref 1.8–7.4)
NEUTROPHILS NFR BLD AUTO: 53.8 % — SIGNIFICANT CHANGE UP (ref 43–77)
PLATELET # BLD AUTO: 296 K/UL — SIGNIFICANT CHANGE UP (ref 150–400)
RBC # BLD: 4.51 M/UL — SIGNIFICANT CHANGE UP (ref 3.8–5.2)
RBC # FLD: 12.4 % — SIGNIFICANT CHANGE UP (ref 10.3–14.5)
WBC # BLD: 6.8 K/UL — SIGNIFICANT CHANGE UP (ref 3.8–10.5)
WBC # FLD AUTO: 6.8 K/UL — SIGNIFICANT CHANGE UP (ref 3.8–10.5)

## 2024-08-12 PROCEDURE — 99214 OFFICE O/P EST MOD 30 MIN: CPT

## 2024-08-12 RX ORDER — PANTOPRAZOLE 40 MG/1
40 TABLET, DELAYED RELEASE ORAL
Refills: 0 | Status: ACTIVE | COMMUNITY

## 2024-08-12 RX ORDER — SUCRALFATE 1 G/1
TABLET ORAL
Refills: 0 | Status: ACTIVE | COMMUNITY

## 2024-08-12 RX ORDER — DICYCLOMINE HYDROCHLORIDE 20 MG/1
20 TABLET ORAL
Refills: 0 | Status: ACTIVE | COMMUNITY

## 2024-08-12 NOTE — ASSESSMENT
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    2)#-H/o DVT + PE in 2013, s/p thrombectomy. -given life threatening event, plan is for long term AC -Continues on Xarelto 20 mg  # history of cervical cancer s/p definitive chemoradiation in June 2013.  She is 11 years out from treatment.  -Follow up with GYN  Reviewed CBC from today: WBC 6.75 Hgb 12.2 HCT 38.3   Plan -Advised to Follow up with GYN asap -Continue Xarelto 20 mg -Mild iron deficiency anemia - on slow-release iron MWF since May 24, Hgb 12.2 today, pending Iron panel -Continue age-appropriate preventive care If iron studies are normal, RTO in 1 year

## 2024-08-12 NOTE — HISTORY OF PRESENT ILLNESS
[de-identified] : Ms. 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  She has a history of abnormal pap smear in 2008, positive HPV.  In February 2013, she had a LEEP procedure which showed invasive squamous cell carcinoma of the cervix in all 4 quadrants of endocervix.  She subsequently had a PET CT which showed positive bilateral common iliac nodes and a node at aortic bifurcation and external iliac nodes. She subsequently had exploratory lap on 3/25/13, and was ultimately found to have metastatic disease to the left internal iliac and right common iliac node. She subsequently received definitive chemoradiation with weekly cisplatin completed in June 2013.  She then received 2800 cGY HDR intracavitary brachytherapy completed in July 2013.  Her course was complicated by DVT and large central pulmonary embolism, cardiogenic shock, and subsequently had embolectomy on 6/18/13.  She remains on warfarin.  Last surveillance PET CT on 2/19/2016 showed BRANDI.  She last had a colposcopy on 7/18/16 by Dr. Vale Gavin which showed HGSIL.  She was told to use Aldara which she last used in Summer 2016.   She's currently recovering from a bilateral ear infection which she has had for 3 weeks. She was seen at urgent care gave her Z-hans.   She is not feeling better after antibiotics.  She is seeing PCP tomorrow.  She has not seen a physician at [facility] for 1 year. She reports her weight fluctuates. She reports a poor appetite. She has chronic pelvic pain at rest. Denies any vaginal discharge. No vaginal bleeding.  She occasionally has hematuria.   [de-identified] : She returns for follow up today Continues on Xarelto 20 mg Continues on sloFe 3x/week She has not followed up with GYN for the last 2 years, last visit 6/9/22 Reports decreased appetite Denies weight loss, night sweats, fever, chills Denies hematuria She has IBS, takes dicyclomine, following GI Dr. Consuelo Ovalle Denies blood in stool, or bleeding issues.  LMP 5/2013 Patient never had a radical hysterectomy. Just got chemo and radiation.  denies recent illness, injury, or hospitalization Denies any pain, cough, chest pain, sob, abdominal pain/fullness, nausea, vomiting, change in bowel or bladder pattern

## 2024-08-31 NOTE — RESULTS/DATA
Clear [FreeTextEntry1] : 10/23/17 PET\par IMPRESSION:  \par Since outside PET/CT February 19, 2016:\par 1.  No evidence of malignancy.\par 2.  Mild diffuse thyroid FDG avidity, probably thyroiditis. Clinical correlation suggested.\par 3.  Unchanged FDG avid tonsillar enlargement and posterior nasopharyngeal FDG avidity as well as mildly FDG avid right cervical lymph node, probably reactive. Clinical correlation suggested.

## 2025-04-28 ENCOUNTER — OUTPATIENT (OUTPATIENT)
Dept: OUTPATIENT SERVICES | Facility: HOSPITAL | Age: 45
LOS: 1 days | Discharge: HOME | End: 2025-04-28

## 2025-04-28 DIAGNOSIS — C53.9 MALIGNANT NEOPLASM OF CERVIX UTERI, UNSPECIFIED: ICD-10-CM

## 2025-04-28 DIAGNOSIS — Z98.89 OTHER SPECIFIED POSTPROCEDURAL STATES: Chronic | ICD-10-CM

## 2025-04-28 DIAGNOSIS — Z96.22 MYRINGOTOMY TUBE(S) STATUS: Chronic | ICD-10-CM

## 2025-04-28 DIAGNOSIS — C53.9 MALIGNANT NEOPLASM OF CERVIX UTERI, UNSPECIFIED: Chronic | ICD-10-CM

## 2025-04-28 DIAGNOSIS — R93.3 ABNORMAL FINDINGS ON DIAGNOSTIC IMAGING OF OTHER PARTS OF DIGESTIVE TRACT: Chronic | ICD-10-CM

## 2025-04-28 DIAGNOSIS — Z86.711 PERSONAL HISTORY OF PULMONARY EMBOLISM: Chronic | ICD-10-CM

## 2025-05-07 ENCOUNTER — RESULT REVIEW (OUTPATIENT)
Age: 45
End: 2025-05-07

## 2025-05-07 ENCOUNTER — APPOINTMENT (OUTPATIENT)
Dept: HEMATOLOGY ONCOLOGY | Facility: CLINIC | Age: 45
End: 2025-05-07
Payer: COMMERCIAL

## 2025-05-07 VITALS
OXYGEN SATURATION: 96 % | SYSTOLIC BLOOD PRESSURE: 132 MMHG | HEIGHT: 64 IN | DIASTOLIC BLOOD PRESSURE: 87 MMHG | BODY MASS INDEX: 34.51 KG/M2 | HEART RATE: 97 BPM | WEIGHT: 202.13 LBS

## 2025-05-07 DIAGNOSIS — Z85.41 PERSONAL HISTORY OF MALIGNANT NEOPLASM OF CERVIX UTERI: ICD-10-CM

## 2025-05-07 DIAGNOSIS — Z86.711 PERSONAL HISTORY OF PULMONARY EMBOLISM: ICD-10-CM

## 2025-05-07 LAB
ALBUMIN SERPL ELPH-MCNC: 4.6 G/DL
ALP BLD-CCNC: 90 U/L
ALT SERPL-CCNC: 25 U/L
ANION GAP SERPL CALC-SCNC: 16 MMOL/L
AST SERPL-CCNC: 17 U/L
BASOPHILS # BLD AUTO: 0.07 K/UL — SIGNIFICANT CHANGE UP (ref 0–0.2)
BASOPHILS NFR BLD AUTO: 1.1 % — SIGNIFICANT CHANGE UP (ref 0–2)
BILIRUB SERPL-MCNC: 0.4 MG/DL
BUN SERPL-MCNC: 17 MG/DL
CALCIUM SERPL-MCNC: 9.9 MG/DL
CHLORIDE SERPL-SCNC: 101 MMOL/L
CO2 SERPL-SCNC: 22 MMOL/L
CREAT SERPL-MCNC: 1.57 MG/DL
EGFRCR SERPLBLD CKD-EPI 2021: 42 ML/MIN/1.73M2
EOSINOPHIL # BLD AUTO: 0.26 K/UL — SIGNIFICANT CHANGE UP (ref 0–0.5)
EOSINOPHIL NFR BLD AUTO: 4.2 % — SIGNIFICANT CHANGE UP (ref 0–6)
FERRITIN SERPL-MCNC: 33 NG/ML
FOLATE SERPL-MCNC: 11.6 NG/ML
GLUCOSE SERPL-MCNC: 159 MG/DL
HCT VFR BLD CALC: 39.1 % — SIGNIFICANT CHANGE UP (ref 34.5–45)
HGB BLD-MCNC: 12.4 G/DL — SIGNIFICANT CHANGE UP (ref 11.5–15.5)
IMM GRANULOCYTES # BLD AUTO: 0.01 K/UL — SIGNIFICANT CHANGE UP (ref 0–0.07)
IMM GRANULOCYTES NFR BLD AUTO: 0.2 % — SIGNIFICANT CHANGE UP (ref 0–0.9)
IRON SATN MFR SERPL: 20 %
IRON SERPL-MCNC: 65 UG/DL
LYMPHOCYTES # BLD AUTO: 2.34 K/UL — SIGNIFICANT CHANGE UP (ref 1–3.3)
LYMPHOCYTES NFR BLD AUTO: 37.8 % — SIGNIFICANT CHANGE UP (ref 13–44)
MCHC RBC-ENTMCNC: 26.3 PG — LOW (ref 27–34)
MCHC RBC-ENTMCNC: 31.7 G/DL — LOW (ref 32–36)
MCV RBC AUTO: 83 FL — SIGNIFICANT CHANGE UP (ref 80–100)
MONOCYTES # BLD AUTO: 0.42 K/UL — SIGNIFICANT CHANGE UP (ref 0–0.9)
MONOCYTES NFR BLD AUTO: 6.8 % — SIGNIFICANT CHANGE UP (ref 2–14)
NEUTROPHILS # BLD AUTO: 3.09 K/UL — SIGNIFICANT CHANGE UP (ref 1.8–7.4)
NEUTROPHILS NFR BLD AUTO: 49.9 % — SIGNIFICANT CHANGE UP (ref 43–77)
NRBC # BLD AUTO: 0 K/UL — SIGNIFICANT CHANGE UP (ref 0–0)
NRBC # FLD: 0 K/UL — SIGNIFICANT CHANGE UP (ref 0–0)
NRBC BLD AUTO-RTO: 0 /100 WBCS — SIGNIFICANT CHANGE UP (ref 0–0)
PLATELET # BLD AUTO: 287 K/UL — SIGNIFICANT CHANGE UP (ref 150–400)
PMV BLD: 8.9 FL — SIGNIFICANT CHANGE UP (ref 7–13)
POTASSIUM SERPL-SCNC: 4.4 MMOL/L
PROT SERPL-MCNC: 7.5 G/DL
RBC # BLD: 4.71 M/UL — SIGNIFICANT CHANGE UP (ref 3.8–5.2)
RBC # FLD: 12.9 % — SIGNIFICANT CHANGE UP (ref 10.3–14.5)
SODIUM SERPL-SCNC: 140 MMOL/L
TIBC SERPL-MCNC: 330 UG/DL
UIBC SERPL-MCNC: 265 UG/DL
VIT B12 SERPL-MCNC: 624 PG/ML
WBC # BLD: 6.19 K/UL — SIGNIFICANT CHANGE UP (ref 3.8–10.5)
WBC # FLD AUTO: 6.19 K/UL — SIGNIFICANT CHANGE UP (ref 3.8–10.5)

## 2025-05-07 PROCEDURE — 99214 OFFICE O/P EST MOD 30 MIN: CPT

## 2025-05-07 PROCEDURE — G2211 COMPLEX E/M VISIT ADD ON: CPT

## 2025-08-06 ENCOUNTER — NON-APPOINTMENT (OUTPATIENT)
Age: 45
End: 2025-08-06

## (undated) DEVICE — FORCEP RADIAL JAW 4 W NDL 2.4MM 2.8MM 240CM ORANGE DISP

## (undated) DEVICE — VALVE ENDOSCOPE DEFENDO SINGLE USE